# Patient Record
Sex: MALE | Race: WHITE | NOT HISPANIC OR LATINO | Employment: FULL TIME | URBAN - METROPOLITAN AREA
[De-identification: names, ages, dates, MRNs, and addresses within clinical notes are randomized per-mention and may not be internally consistent; named-entity substitution may affect disease eponyms.]

---

## 2023-02-23 PROBLEM — H61.23 BILATERAL IMPACTED CERUMEN: Status: ACTIVE | Noted: 2023-02-23

## 2023-02-23 PROBLEM — I44.4 LEFT ANTERIOR FASCICULAR BLOCK (LAFB): Status: ACTIVE | Noted: 2023-02-23

## 2023-02-23 PROBLEM — L50.0 ALLERGIC URTICARIA: Status: ACTIVE | Noted: 2023-02-23

## 2023-02-23 PROBLEM — G47.33 OBSTRUCTIVE SLEEP APNEA SYNDROME, SEVERE: Status: ACTIVE | Noted: 2023-02-23

## 2023-02-23 PROBLEM — R53.83 FATIGUE: Status: ACTIVE | Noted: 2023-02-23

## 2023-02-23 PROBLEM — I10 HYPERTENSION: Status: ACTIVE | Noted: 2023-02-23

## 2023-02-23 PROBLEM — R32 URINARY LEAKAGE: Status: ACTIVE | Noted: 2023-02-23

## 2023-02-23 PROBLEM — H61.22 IMPACTED CERUMEN OF LEFT EAR: Status: ACTIVE | Noted: 2023-02-23

## 2023-02-23 PROBLEM — M70.62 TROCHANTERIC BURSITIS OF LEFT HIP: Status: ACTIVE | Noted: 2023-02-23

## 2023-02-23 PROBLEM — E78.5 HYPERLIPIDEMIA: Status: ACTIVE | Noted: 2023-02-23

## 2023-02-23 PROBLEM — L30.9 DERMATITIS: Status: ACTIVE | Noted: 2023-02-23

## 2023-02-23 PROBLEM — S46.111A: Status: ACTIVE | Noted: 2023-02-23

## 2023-02-23 PROBLEM — E55.9 VITAMIN D DEFICIENCY: Status: ACTIVE | Noted: 2023-02-23

## 2023-02-23 PROBLEM — N53.19 EJACULATORY DISORDER: Status: ACTIVE | Noted: 2023-02-23

## 2023-02-23 PROBLEM — H90.3 SENSORINEURAL HEARING LOSS, BILATERAL: Status: ACTIVE | Noted: 2023-02-23

## 2023-02-23 PROBLEM — M25.552 LATERAL PAIN OF LEFT HIP: Status: ACTIVE | Noted: 2023-02-23

## 2023-02-23 PROBLEM — N52.9 ERECTILE DYSFUNCTION: Status: ACTIVE | Noted: 2023-02-23

## 2023-02-23 PROBLEM — G47.25 SLEEP DISORDER, CIRCADIAN, JET LAG TYPE: Status: ACTIVE | Noted: 2023-02-23

## 2023-02-23 PROBLEM — J30.9 ALLERGIC RHINITIS: Status: ACTIVE | Noted: 2023-02-23

## 2023-02-23 PROBLEM — R35.0 URINARY FREQUENCY: Status: ACTIVE | Noted: 2023-02-23

## 2023-02-23 PROBLEM — N62 GYNECOMASTIA: Status: ACTIVE | Noted: 2023-02-23

## 2023-02-23 PROBLEM — E11.9 DIABETES MELLITUS (MULTI): Status: ACTIVE | Noted: 2023-02-23

## 2023-02-23 PROBLEM — F41.8 DEPRESSION WITH ANXIETY: Status: ACTIVE | Noted: 2023-02-23

## 2023-02-23 PROBLEM — M25.559 HIP DISCOMFORT: Status: ACTIVE | Noted: 2023-02-23

## 2023-02-23 PROBLEM — I77.810 DILATED AORTIC ROOT (CMS-HCC): Status: ACTIVE | Noted: 2023-02-23

## 2023-02-23 PROBLEM — E29.1 HYPOGONADISM MALE: Status: ACTIVE | Noted: 2023-02-23

## 2023-02-23 PROBLEM — M54.50 LOW BACK PAIN: Status: ACTIVE | Noted: 2023-02-23

## 2023-02-23 PROBLEM — N53.9 MALE SEXUAL DYSFUNCTION: Status: ACTIVE | Noted: 2023-02-23

## 2023-02-23 PROBLEM — G47.00 INSOMNIA: Status: ACTIVE | Noted: 2023-02-23

## 2023-02-23 PROBLEM — N40.0 BENIGN PROSTATE HYPERPLASIA: Status: ACTIVE | Noted: 2023-02-23

## 2023-02-23 RX ORDER — OXICONAZOLE NITRATE 10 MG/ML
1 LOTION TOPICAL 2 TIMES DAILY
COMMUNITY
Start: 2021-04-13 | End: 2024-03-21 | Stop reason: ALTCHOICE

## 2023-02-23 RX ORDER — ARMODAFINIL 50 MG/1
50 TABLET ORAL 2 TIMES DAILY
COMMUNITY
Start: 2012-12-10 | End: 2024-01-02 | Stop reason: SDUPTHER

## 2023-02-23 RX ORDER — VENLAFAXINE HYDROCHLORIDE 150 MG/1
150 TABLET, EXTENDED RELEASE ORAL DAILY
COMMUNITY
End: 2023-12-15 | Stop reason: SDUPTHER

## 2023-02-23 RX ORDER — DOCUSATE SODIUM 100 MG/1
100 CAPSULE, LIQUID FILLED ORAL 2 TIMES DAILY
COMMUNITY

## 2023-02-23 RX ORDER — METFORMIN HYDROCHLORIDE 1000 MG/1
1000 TABLET ORAL 2 TIMES DAILY
COMMUNITY
Start: 2012-12-10

## 2023-02-23 RX ORDER — SODIUM FLUORIDE 5 MG/G
1.1 GEL, DENTIFRICE DENTAL DAILY
COMMUNITY
Start: 2021-06-25 | End: 2023-11-28 | Stop reason: ALTCHOICE

## 2023-02-23 RX ORDER — OLANZAPINE 2.5 MG/1
2.5 TABLET ORAL DAILY
COMMUNITY
Start: 2012-12-11 | End: 2024-01-31 | Stop reason: SDUPTHER

## 2023-02-23 RX ORDER — QUINAPRIL 5 1/1
5 TABLET ORAL DAILY
COMMUNITY
Start: 2012-08-02 | End: 2023-04-03 | Stop reason: SDUPTHER

## 2023-02-23 RX ORDER — TESTOSTERONE 50 MG/5G
50 GEL TRANSDERMAL 2 TIMES DAILY
COMMUNITY
Start: 2019-05-28 | End: 2023-11-28 | Stop reason: ALTCHOICE

## 2023-02-23 RX ORDER — TRAZODONE HYDROCHLORIDE 50 MG/1
50 TABLET ORAL NIGHTLY
COMMUNITY
Start: 2012-08-06 | End: 2024-01-31 | Stop reason: SDUPTHER

## 2023-02-23 RX ORDER — TAMSULOSIN HYDROCHLORIDE 0.4 MG/1
0.4 CAPSULE ORAL 2 TIMES DAILY
COMMUNITY
Start: 2014-11-04 | End: 2023-10-19 | Stop reason: SDUPTHER

## 2023-02-23 RX ORDER — FLUTICASONE PROPIONATE 50 MCG
2 SPRAY, SUSPENSION (ML) NASAL DAILY
COMMUNITY
Start: 2019-05-17 | End: 2023-04-03 | Stop reason: SDUPTHER

## 2023-02-23 RX ORDER — SILDENAFIL 100 MG/1
100 TABLET, FILM COATED ORAL
COMMUNITY
Start: 2019-03-26 | End: 2023-04-03 | Stop reason: SDUPTHER

## 2023-02-23 RX ORDER — GLIMEPIRIDE 4 MG/1
4 TABLET ORAL DAILY
COMMUNITY
Start: 2012-08-01 | End: 2023-10-19 | Stop reason: SDUPTHER

## 2023-02-23 RX ORDER — TRIAMCINOLONE ACETONIDE 1 MG/G
CREAM TOPICAL
COMMUNITY
Start: 2022-09-12

## 2023-02-23 RX ORDER — ATORVASTATIN CALCIUM 10 MG/1
10 TABLET, FILM COATED ORAL DAILY
COMMUNITY
Start: 2012-12-10 | End: 2023-06-19 | Stop reason: SDUPTHER

## 2023-03-31 PROBLEM — U07.1 COVID-19: Status: ACTIVE | Noted: 2023-03-31

## 2023-03-31 RX ORDER — TAMSULOSIN HYDROCHLORIDE 0.4 MG/1
2 CAPSULE ORAL DAILY
COMMUNITY
Start: 2014-11-04 | End: 2023-12-11 | Stop reason: SDUPTHER

## 2023-04-03 ENCOUNTER — LAB (OUTPATIENT)
Dept: LAB | Facility: LAB | Age: 78
End: 2023-04-03
Payer: COMMERCIAL

## 2023-04-03 ENCOUNTER — OFFICE VISIT (OUTPATIENT)
Dept: PRIMARY CARE | Facility: CLINIC | Age: 78
End: 2023-04-03
Payer: COMMERCIAL

## 2023-04-03 VITALS
SYSTOLIC BLOOD PRESSURE: 121 MMHG | TEMPERATURE: 97.8 F | DIASTOLIC BLOOD PRESSURE: 76 MMHG | HEART RATE: 85 BPM | WEIGHT: 180 LBS | BODY MASS INDEX: 30.9 KG/M2 | OXYGEN SATURATION: 95 %

## 2023-04-03 DIAGNOSIS — N53.9 MALE SEXUAL DYSFUNCTION: ICD-10-CM

## 2023-04-03 DIAGNOSIS — Z00.00 HEALTHCARE MAINTENANCE: ICD-10-CM

## 2023-04-03 DIAGNOSIS — E11.42 TYPE 2 DIABETES MELLITUS WITH DIABETIC POLYNEUROPATHY, WITHOUT LONG-TERM CURRENT USE OF INSULIN (MULTI): ICD-10-CM

## 2023-04-03 DIAGNOSIS — N52.9 ERECTILE DYSFUNCTION, UNSPECIFIED ERECTILE DYSFUNCTION TYPE: ICD-10-CM

## 2023-04-03 DIAGNOSIS — I10 HYPERTENSION, UNSPECIFIED TYPE: ICD-10-CM

## 2023-04-03 DIAGNOSIS — Z00.00 MEDICARE ANNUAL WELLNESS VISIT, SUBSEQUENT: ICD-10-CM

## 2023-04-03 DIAGNOSIS — J30.2 SEASONAL ALLERGIC RHINITIS, UNSPECIFIED TRIGGER: Primary | ICD-10-CM

## 2023-04-03 DIAGNOSIS — F41.8 DEPRESSION WITH ANXIETY: ICD-10-CM

## 2023-04-03 PROBLEM — U07.1 COVID-19: Status: RESOLVED | Noted: 2023-03-31 | Resolved: 2023-04-03

## 2023-04-03 LAB
ALANINE AMINOTRANSFERASE (SGPT) (U/L) IN SER/PLAS: 20 U/L (ref 10–52)
ALBUMIN (G/DL) IN SER/PLAS: 4.4 G/DL (ref 3.4–5)
ALBUMIN (MG/L) IN URINE: 12.5 MG/L
ALBUMIN/CREATININE (UG/MG) IN URINE: 8 UG/MG CRT (ref 0–30)
ALKALINE PHOSPHATASE (U/L) IN SER/PLAS: 47 U/L (ref 33–136)
ANION GAP IN SER/PLAS: 16 MMOL/L (ref 10–20)
ASPARTATE AMINOTRANSFERASE (SGOT) (U/L) IN SER/PLAS: 20 U/L (ref 9–39)
BASOPHILS (10*3/UL) IN BLOOD BY AUTOMATED COUNT: 0.05 X10E9/L (ref 0–0.1)
BASOPHILS/100 LEUKOCYTES IN BLOOD BY AUTOMATED COUNT: 0.8 % (ref 0–2)
BILIRUBIN TOTAL (MG/DL) IN SER/PLAS: 0.5 MG/DL (ref 0–1.2)
CALCIUM (MG/DL) IN SER/PLAS: 9.7 MG/DL (ref 8.6–10.6)
CARBON DIOXIDE, TOTAL (MMOL/L) IN SER/PLAS: 29 MMOL/L (ref 21–32)
CHLORIDE (MMOL/L) IN SER/PLAS: 101 MMOL/L (ref 98–107)
CHOLESTEROL (MG/DL) IN SER/PLAS: 138 MG/DL (ref 0–199)
CHOLESTEROL IN HDL (MG/DL) IN SER/PLAS: 40 MG/DL
CHOLESTEROL/HDL RATIO: 3.5
CREATININE (MG/DL) IN SER/PLAS: 1.04 MG/DL (ref 0.5–1.3)
CREATININE (MG/DL) IN URINE: 157 MG/DL (ref 20–370)
EOSINOPHILS (10*3/UL) IN BLOOD BY AUTOMATED COUNT: 0.27 X10E9/L (ref 0–0.4)
EOSINOPHILS/100 LEUKOCYTES IN BLOOD BY AUTOMATED COUNT: 4.2 % (ref 0–6)
ERYTHROCYTE DISTRIBUTION WIDTH (RATIO) BY AUTOMATED COUNT: 13.1 % (ref 11.5–14.5)
ERYTHROCYTE MEAN CORPUSCULAR HEMOGLOBIN CONCENTRATION (G/DL) BY AUTOMATED: 33.4 G/DL (ref 32–36)
ERYTHROCYTE MEAN CORPUSCULAR VOLUME (FL) BY AUTOMATED COUNT: 94 FL (ref 80–100)
ERYTHROCYTES (10*6/UL) IN BLOOD BY AUTOMATED COUNT: 4.63 X10E12/L (ref 4.5–5.9)
ESTIMATED AVERAGE GLUCOSE FOR HBA1C: 157 MG/DL
GFR MALE: 73 ML/MIN/1.73M2
GLUCOSE (MG/DL) IN SER/PLAS: 115 MG/DL (ref 74–99)
HEMATOCRIT (%) IN BLOOD BY AUTOMATED COUNT: 43.7 % (ref 41–52)
HEMOGLOBIN (G/DL) IN BLOOD: 14.6 G/DL (ref 13.5–17.5)
HEMOGLOBIN A1C/HEMOGLOBIN TOTAL IN BLOOD: 7.1 %
IMMATURE GRANULOCYTES/100 LEUKOCYTES IN BLOOD BY AUTOMATED COUNT: 0.3 % (ref 0–0.9)
LDL: 73 MG/DL (ref 0–99)
LEUKOCYTES (10*3/UL) IN BLOOD BY AUTOMATED COUNT: 6.4 X10E9/L (ref 4.4–11.3)
LYMPHOCYTES (10*3/UL) IN BLOOD BY AUTOMATED COUNT: 2 X10E9/L (ref 0.8–3)
LYMPHOCYTES/100 LEUKOCYTES IN BLOOD BY AUTOMATED COUNT: 31.1 % (ref 13–44)
MONOCYTES (10*3/UL) IN BLOOD BY AUTOMATED COUNT: 0.63 X10E9/L (ref 0.05–0.8)
MONOCYTES/100 LEUKOCYTES IN BLOOD BY AUTOMATED COUNT: 9.8 % (ref 2–10)
MUCUS, URINE: NORMAL /LPF
NEUTROPHILS (10*3/UL) IN BLOOD BY AUTOMATED COUNT: 3.47 X10E9/L (ref 1.6–5.5)
NEUTROPHILS/100 LEUKOCYTES IN BLOOD BY AUTOMATED COUNT: 53.8 % (ref 40–80)
NRBC (PER 100 WBCS) BY AUTOMATED COUNT: 0 /100 WBC (ref 0–0)
PLATELETS (10*3/UL) IN BLOOD AUTOMATED COUNT: 214 X10E9/L (ref 150–450)
POTASSIUM (MMOL/L) IN SER/PLAS: 4.5 MMOL/L (ref 3.5–5.3)
PROTEIN TOTAL: 7.2 G/DL (ref 6.4–8.2)
RBC, URINE: 1 /HPF (ref 0–5)
SODIUM (MMOL/L) IN SER/PLAS: 141 MMOL/L (ref 136–145)
TRIGLYCERIDE (MG/DL) IN SER/PLAS: 124 MG/DL (ref 0–149)
UREA NITROGEN (MG/DL) IN SER/PLAS: 17 MG/DL (ref 6–23)
VLDL: 25 MG/DL (ref 0–40)
WBC, URINE: NORMAL /HPF (ref 0–5)

## 2023-04-03 PROCEDURE — 1160F RVW MEDS BY RX/DR IN RCRD: CPT | Performed by: STUDENT IN AN ORGANIZED HEALTH CARE EDUCATION/TRAINING PROGRAM

## 2023-04-03 PROCEDURE — 83036 HEMOGLOBIN GLYCOSYLATED A1C: CPT

## 2023-04-03 PROCEDURE — G0439 PPPS, SUBSEQ VISIT: HCPCS | Performed by: STUDENT IN AN ORGANIZED HEALTH CARE EDUCATION/TRAINING PROGRAM

## 2023-04-03 PROCEDURE — 1036F TOBACCO NON-USER: CPT | Performed by: STUDENT IN AN ORGANIZED HEALTH CARE EDUCATION/TRAINING PROGRAM

## 2023-04-03 PROCEDURE — 3074F SYST BP LT 130 MM HG: CPT | Performed by: STUDENT IN AN ORGANIZED HEALTH CARE EDUCATION/TRAINING PROGRAM

## 2023-04-03 PROCEDURE — 82043 UR ALBUMIN QUANTITATIVE: CPT

## 2023-04-03 PROCEDURE — 1159F MED LIST DOCD IN RCRD: CPT | Performed by: STUDENT IN AN ORGANIZED HEALTH CARE EDUCATION/TRAINING PROGRAM

## 2023-04-03 PROCEDURE — 82570 ASSAY OF URINE CREATININE: CPT

## 2023-04-03 PROCEDURE — 1170F FXNL STATUS ASSESSED: CPT | Performed by: STUDENT IN AN ORGANIZED HEALTH CARE EDUCATION/TRAINING PROGRAM

## 2023-04-03 PROCEDURE — 36415 COLL VENOUS BLD VENIPUNCTURE: CPT

## 2023-04-03 PROCEDURE — 80053 COMPREHEN METABOLIC PANEL: CPT

## 2023-04-03 PROCEDURE — 85025 COMPLETE CBC W/AUTO DIFF WBC: CPT

## 2023-04-03 PROCEDURE — 99397 PER PM REEVAL EST PAT 65+ YR: CPT | Performed by: STUDENT IN AN ORGANIZED HEALTH CARE EDUCATION/TRAINING PROGRAM

## 2023-04-03 PROCEDURE — 81001 URINALYSIS AUTO W/SCOPE: CPT

## 2023-04-03 PROCEDURE — 3078F DIAST BP <80 MM HG: CPT | Performed by: STUDENT IN AN ORGANIZED HEALTH CARE EDUCATION/TRAINING PROGRAM

## 2023-04-03 PROCEDURE — 80061 LIPID PANEL: CPT

## 2023-04-03 RX ORDER — SILDENAFIL 100 MG/1
100 TABLET, FILM COATED ORAL
Qty: 10 TABLET | Refills: 2 | Status: SHIPPED | OUTPATIENT
Start: 2023-04-03

## 2023-04-03 RX ORDER — FINASTERIDE 5 MG/1
5 TABLET, FILM COATED ORAL
COMMUNITY
Start: 2018-03-04 | End: 2023-11-28 | Stop reason: ALTCHOICE

## 2023-04-03 RX ORDER — FLUTICASONE PROPIONATE 50 MCG
2 SPRAY, SUSPENSION (ML) NASAL DAILY
Qty: 16 G | Refills: 2 | Status: SHIPPED | OUTPATIENT
Start: 2023-04-03 | End: 2023-07-03

## 2023-04-03 RX ORDER — QUINAPRIL 5 1/1
5 TABLET ORAL DAILY
Qty: 90 TABLET | Refills: 3 | Status: SHIPPED
Start: 2023-04-03 | End: 2023-06-29 | Stop reason: WASHOUT

## 2023-04-03 ASSESSMENT — PATIENT HEALTH QUESTIONNAIRE - PHQ9
SUM OF ALL RESPONSES TO PHQ9 QUESTIONS 1 AND 2: 1
10. IF YOU CHECKED OFF ANY PROBLEMS, HOW DIFFICULT HAVE THESE PROBLEMS MADE IT FOR YOU TO DO YOUR WORK, TAKE CARE OF THINGS AT HOME, OR GET ALONG WITH OTHER PEOPLE: SOMEWHAT DIFFICULT
2. FEELING DOWN, DEPRESSED OR HOPELESS: SEVERAL DAYS
1. LITTLE INTEREST OR PLEASURE IN DOING THINGS: NOT AT ALL

## 2023-04-03 ASSESSMENT — ACTIVITIES OF DAILY LIVING (ADL)
GROCERY_SHOPPING: INDEPENDENT
DOING_HOUSEWORK: INDEPENDENT
BATHING: INDEPENDENT
MANAGING_FINANCES: INDEPENDENT
DRESSING: INDEPENDENT
TAKING_MEDICATION: INDEPENDENT

## 2023-04-03 NOTE — PATIENT INSTRUCTIONS
Please stop at the lab ([address]) to complete your blood and/or urine work that I've ordered for you.    I will contact you with the results at my soonest convenience. I strongly urge you to use [employer] as this is the quickest and easiest way to access your results and recieve my correspondences.     I have renewed your medications today     I recommend that you lose weight via lifestyle modifications such as diet and exercise.

## 2023-04-03 NOTE — PROGRESS NOTES
Subjective   Patient ID: Farooq Hendrix is a 77 y.o. male who presents for Annual Exam and Medicare Annual Wellness Visit Subsequent.    76 CM with PMhx controlled DM2, controlled severe depression, ED presenting for CPE and MWV.     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 , 1 son ([place], ). Nonsmoker. Social EtOH. No illicits. Occasional exercise.     Re: DM2- see endocrine notes. On metformin and glimepiride. A1C at goal (7.0%). Patient denies symptomatic highs or lows with regards to their glucose. Reports no polyuria, polydipsia, fatigue, vision changes. Reports longstanding neuropathy in feet that is mild. Reports fair foot hygiene, denies knowledge of any foot ulcers or trauma.      Re: ED - stable. See urology notes from Dr. Severino. Erections are OK. Asking for refill on his viagra.      Re: psych - depression is severe, currently well controlled. On olanzapine, armodafinil, and venlafaxine. Meets with psych regularly (Dr. Foy).    Re: HTN - asking for refill of his ACEi. Reports no sx high or low from HTN; denies blurry vision, HA, dizziness LoC CP SoB Goldstein and leg swelling      Re: HM - CRS UTD, no longer required. PSA no longer required. Shots currently UTD.      PMHx, FHx, Social Hx, Surg Hx personally reviewed at this appointment. No pertinent findings and/or changes from prior (if applicable).     ROS: Denies wt gain/loss f/c HA LoC CP SOB NVDC. See HPI above, and scanned sheet (if applicable). All other systems are reviewed and are without complaint.            Review of Systems    Objective   /76   Pulse 85   Temp 36.6 °C (97.8 °F)   Wt 81.6 kg (180 lb)   SpO2 95%   BMI 30.90 kg/m²     Physical Exam  Gen:  NAD. AAO x3.  HEENT: NC/AT. Anicteric sclera, symmetric pupils. MMM no thrush.  Neck: Soft, supple. No LAD. No goiter.  CV: RRR nl s1s2 no m/r/g  Pulm: CTAB no w/r/r, good air exchange  GI: soft NTND BS+ no hsm  Ext: WWP no edema  Neuro: II-XII grossly intact,  nonfocal systemic findings  MSK: 5/5 strength b/l UE and LE  Gait: unremarkable   Feet: no ulcers, nl monofilament testing       Assessment/Plan   Problem List Items Addressed This Visit       Allergic rhinitis - Primary    Relevant Medications    fluticasone (Flonase) 50 mcg/actuation nasal spray    Depression with anxiety     Follow up psych  Continue with his current meds         Type 2 diabetes mellitus with diabetic polyneuropathy, without long-term current use of insulin (CMS/[facility])    Relevant Orders    CBC and Auto Differential    Comprehensive Metabolic Panel    Lipid Panel    Hemoglobin A1C    Albumin , Urine Random    Urinalysis Microscopic Only    Male sexual dysfunction    Relevant Medications    sildenafil (Viagra) 100 mg tablet    Erectile dysfunction    Hypertension     - continue current regimen  - routine lab work if not recent  - continue lifestyle modifications          Relevant Medications    quinapril (Accupril) 5 mg tablet    Other Relevant Orders    CBC and Auto Differential    Comprehensive Metabolic Panel    Lipid Panel     Other Visit Diagnoses       Healthcare maintenance        Relevant Orders    CBC and Auto Differential    Comprehensive Metabolic Panel    Lipid Panel

## 2023-04-17 LAB
AMPHETAMINES,URINE: <50 NG/ML
MDA,URINE: <200 NG/ML
MDEA,URINE: <200 NG/ML
MDMA,UR: <200 NG/ML
METHAMPHETAMINE QUANTITATIVE URINE: <200 NG/ML
PHENTERMINE,UR: <200 NG/ML

## 2023-06-19 DIAGNOSIS — E78.5 HYPERLIPIDEMIA, UNSPECIFIED HYPERLIPIDEMIA TYPE: ICD-10-CM

## 2023-06-20 RX ORDER — ATORVASTATIN CALCIUM 10 MG/1
10 TABLET, FILM COATED ORAL DAILY
Qty: 90 TABLET | Refills: 3 | Status: SHIPPED | OUTPATIENT
Start: 2023-06-20 | End: 2024-01-02 | Stop reason: SDUPTHER

## 2023-06-29 DIAGNOSIS — E11.42 TYPE 2 DIABETES MELLITUS WITH DIABETIC POLYNEUROPATHY, WITHOUT LONG-TERM CURRENT USE OF INSULIN (MULTI): Primary | ICD-10-CM

## 2023-06-29 DIAGNOSIS — I10 HYPERTENSION, UNSPECIFIED TYPE: ICD-10-CM

## 2023-06-29 RX ORDER — LISINOPRIL 10 MG/1
10 TABLET ORAL DAILY
Qty: 90 TABLET | Refills: 3 | Status: SHIPPED | OUTPATIENT
Start: 2023-06-29 | End: 2024-06-28

## 2023-07-01 DIAGNOSIS — J30.2 SEASONAL ALLERGIC RHINITIS, UNSPECIFIED TRIGGER: ICD-10-CM

## 2023-07-03 RX ORDER — FLUTICASONE PROPIONATE 50 MCG
2 SPRAY, SUSPENSION (ML) NASAL DAILY
Qty: 48 ML | Refills: 2 | Status: SHIPPED | OUTPATIENT
Start: 2023-07-03

## 2023-08-02 ENCOUNTER — APPOINTMENT (OUTPATIENT)
Dept: LAB | Facility: LAB | Age: 78
End: 2023-08-02
Payer: COMMERCIAL

## 2023-08-02 LAB
CALCIDIOL (25 OH VITAMIN D3) (NG/ML) IN SER/PLAS: 18 NG/ML
COBALAMIN (VITAMIN B12) (PG/ML) IN SER/PLAS: 280 PG/ML (ref 211–911)
FOLATE (NG/ML) IN SER/PLAS: 15.7 NG/ML
THYROTROPIN (MIU/L) IN SER/PLAS BY DETECTION LIMIT <= 0.05 MIU/L: 1.09 MIU/L (ref 0.44–3.98)

## 2023-09-19 PROBLEM — R52 PAIN: Status: ACTIVE | Noted: 2019-03-19

## 2023-09-19 PROBLEM — L23.9 ALLERGIC CONTACT DERMATITIS: Status: ACTIVE | Noted: 2019-09-25

## 2023-09-19 PROBLEM — F39 MOOD DISORDER (CMS-HCC): Status: ACTIVE | Noted: 2023-09-19

## 2023-09-19 PROBLEM — B07.8 OTHER VIRAL WARTS: Status: ACTIVE | Noted: 2018-10-09

## 2023-09-19 PROBLEM — E11.9 CONTROLLED TYPE 2 DIABETES MELLITUS WITHOUT COMPLICATION, WITHOUT LONG-TERM CURRENT USE OF INSULIN (MULTI): Status: ACTIVE | Noted: 2017-02-15

## 2023-09-19 PROBLEM — Z85.828 PERSONAL HISTORY OF OTHER MALIGNANT NEOPLASM OF SKIN: Status: ACTIVE | Noted: 2018-06-13

## 2023-09-19 PROBLEM — L30.4 INTERTRIGO: Status: ACTIVE | Noted: 2019-02-19

## 2023-09-19 PROBLEM — L57.0 KERATOSIS: Status: ACTIVE | Noted: 2017-02-23

## 2023-09-19 PROBLEM — D48.5 NEOPLASM OF UNCERTAIN BEHAVIOR OF SKIN: Status: ACTIVE | Noted: 2018-03-01

## 2023-09-19 PROBLEM — C80.1 MALIGNANT NEOPLASM (MULTI): Status: ACTIVE | Noted: 2021-08-09

## 2023-09-19 PROBLEM — L73.9 FOLLICULAR DISORDER: Status: ACTIVE | Noted: 2019-06-11

## 2023-09-19 PROBLEM — B35.3 TINEA PEDIS: Status: ACTIVE | Noted: 2019-02-19

## 2023-09-19 PROBLEM — L57.0 ACTINIC KERATOSIS: Status: ACTIVE | Noted: 2017-02-23

## 2023-09-19 PROBLEM — C44.91 BASAL CELL CARCINOMA: Status: ACTIVE | Noted: 2018-03-14

## 2023-09-19 PROBLEM — L29.9 PRURITUS: Status: ACTIVE | Noted: 2017-02-14

## 2023-09-19 PROBLEM — R21 RASH AND NONSPECIFIC SKIN ERUPTION: Status: ACTIVE | Noted: 2017-02-14

## 2023-09-19 RX ORDER — DULOXETIN HYDROCHLORIDE 20 MG/1
CAPSULE, DELAYED RELEASE ORAL
COMMUNITY
End: 2023-11-28 | Stop reason: ALTCHOICE

## 2023-09-19 RX ORDER — CICLOPIROX OLAMINE 7.7 MG/G
CREAM TOPICAL
COMMUNITY
Start: 2018-02-26

## 2023-09-19 RX ORDER — TRAZODONE HYDROCHLORIDE 50 MG/1
2 TABLET ORAL NIGHTLY
COMMUNITY
Start: 2012-05-21 | End: 2023-10-19 | Stop reason: SDUPTHER

## 2023-09-19 RX ORDER — METFORMIN HYDROCHLORIDE 1000 MG/1
1 TABLET ORAL 2 TIMES DAILY
COMMUNITY
Start: 2012-12-10 | End: 2023-10-19 | Stop reason: SDUPTHER

## 2023-09-19 RX ORDER — VENLAFAXINE 37.5 MG/1
112.5 TABLET ORAL
COMMUNITY
End: 2023-10-19 | Stop reason: ALTCHOICE

## 2023-09-19 RX ORDER — ATORVASTATIN CALCIUM 10 MG/1
TABLET, FILM COATED ORAL
COMMUNITY
End: 2023-10-19 | Stop reason: SDUPTHER

## 2023-09-19 RX ORDER — ARMODAFINIL 50 MG/1
100 TABLET ORAL
COMMUNITY
Start: 2012-08-23 | End: 2023-10-19 | Stop reason: SDUPTHER

## 2023-09-19 RX ORDER — ASPIRIN 81 MG/1
81 TABLET ORAL
COMMUNITY
End: 2023-11-28 | Stop reason: ALTCHOICE

## 2023-09-19 RX ORDER — QUINAPRIL 5 1/1
5 TABLET ORAL
COMMUNITY
End: 2023-11-28 | Stop reason: ALTCHOICE

## 2023-09-19 RX ORDER — SEMAGLUTIDE 0.68 MG/ML
INJECTION, SOLUTION SUBCUTANEOUS
COMMUNITY
Start: 2023-06-22 | End: 2023-12-11 | Stop reason: SDUPTHER

## 2023-10-04 ENCOUNTER — CLINICAL SUPPORT (OUTPATIENT)
Dept: PRIMARY CARE | Facility: CLINIC | Age: 78
End: 2023-10-04
Payer: COMMERCIAL

## 2023-10-04 PROCEDURE — 90471 IMMUNIZATION ADMIN: CPT | Performed by: STUDENT IN AN ORGANIZED HEALTH CARE EDUCATION/TRAINING PROGRAM

## 2023-10-04 PROCEDURE — 90662 IIV NO PRSV INCREASED AG IM: CPT | Performed by: STUDENT IN AN ORGANIZED HEALTH CARE EDUCATION/TRAINING PROGRAM

## 2023-10-19 ENCOUNTER — OFFICE VISIT (OUTPATIENT)
Dept: OTOLARYNGOLOGY | Facility: CLINIC | Age: 78
End: 2023-10-19
Payer: COMMERCIAL

## 2023-10-19 VITALS — WEIGHT: 170 LBS | TEMPERATURE: 96.9 F | BODY MASS INDEX: 29.18 KG/M2

## 2023-10-19 DIAGNOSIS — H90.3 SENSORINEURAL HEARING LOSS (SNHL) OF BOTH EARS: Primary | ICD-10-CM

## 2023-10-19 DIAGNOSIS — H61.23 BILATERAL IMPACTED CERUMEN: ICD-10-CM

## 2023-10-19 PROCEDURE — 1036F TOBACCO NON-USER: CPT | Performed by: NURSE PRACTITIONER

## 2023-10-19 PROCEDURE — 69210 REMOVE IMPACTED EAR WAX UNI: CPT | Performed by: NURSE PRACTITIONER

## 2023-10-19 PROCEDURE — 1160F RVW MEDS BY RX/DR IN RCRD: CPT | Performed by: NURSE PRACTITIONER

## 2023-10-19 PROCEDURE — 99212 OFFICE O/P EST SF 10 MIN: CPT | Performed by: NURSE PRACTITIONER

## 2023-10-19 PROCEDURE — 1159F MED LIST DOCD IN RCRD: CPT | Performed by: NURSE PRACTITIONER

## 2023-10-19 PROCEDURE — 1126F AMNT PAIN NOTED NONE PRSNT: CPT | Performed by: NURSE PRACTITIONER

## 2023-10-19 ASSESSMENT — PATIENT HEALTH QUESTIONNAIRE - PHQ9
1. LITTLE INTEREST OR PLEASURE IN DOING THINGS: NOT AT ALL
SUM OF ALL RESPONSES TO PHQ9 QUESTIONS 1 AND 2: 0
2. FEELING DOWN, DEPRESSED OR HOPELESS: NOT AT ALL

## 2023-10-19 NOTE — PROGRESS NOTES
Subjective   Patient ID: Farooq Hendrix is a 78 y.o. male who presents for Follow-up.  HPI patient is here for routine ear cleaning follow-up.  He was last seen by Sully Apodaca CNP on March 2023.  Patient has a known bilateral sensorineural hearing loss and wears bilateral hearing aids.  He reports that he gains benefits with the hearing aids.  In the interim there is no ear infection.  He denies having any drainage or pain today.    Review of Systems      All other systems have been reviewed and are negative for complaints except for those mentioned in history of present illness, past medical history and problem list       Objective   Physical Exam  CONSTITUTIONAL: No acute distress, normal facial features; No fever; no chills  VOICE: No hoarseness or other audible abnormality  RESPIRATION: Breathing comfortably, no stridor; normal breathing effort  CV: No cyanosis visible on the face and neck area  EYES:Pupils equal and round ; no erythema; conjunctiva clear; sclera white  NEURO: Alert and oriented, able to raise eyebrows symmetrical bilateral, smile with no facial droop, able to swallow  HEAD AND FACE: Symmetric facial features, no masses or lesions    Right ear examination: External ear normal.  EAC with impacted cerumen.  TM not visualized.  Left ear examination: External ear normal.  EAC with impacted cerumen.  TM not visualized.    NOSE: External nose midline  ORAL CAVITY: No lesions of external lips  NECK/LYMPH: No obvious deformity or lesions; trachea is midline  PSYCH: Alert and oriented with appropriate mood and affect       Patient ID: Farooq Hendrix is a 78 y.o. male.    Procedures  Cerumen removal    Consent:  The planned procedure is discussed including possible risk, benefits and alternative treatments reviewed.  Verbal consent is obtained.    Indications:Obstructed cerumen is noted affecting hearing and causing discomfort.    Procedure: The ears are examined microscopically.  Using speculum,  rivera, #7, #5 suction the obstructive cerumen in both the ears were removed.    Findings: Cerumen and epithelial debris obstruction in both external auditory canals.  Inspection of tympanic membrane after cleaning showed intact with no effusion, retraction or perforation.    Post procedure: The patient tolerated the procedure well without complications     Assessment/Plan       Problem List Items Addressed This Visit       Bilateral impacted cerumen     Other Visit Diagnoses       Sensorineural hearing loss (SNHL) of both ears    -  Primary         This patient presents for evaluation of cerumen impaction. The ear/s cleaned using microscope and instruments.  Patient tolerated the procedure well. Inspection of TM after cleaning showed intact and WNL.  Patient was provided instructions on ear care for cerumen in the discussion summary. Patient may follow up as needed for repeat cleaning or for all other ENT concerns.  All questions were answered to patient's satisfaction.

## 2023-10-27 ENCOUNTER — CLINICAL SUPPORT (OUTPATIENT)
Dept: AUDIOLOGY | Facility: HOSPITAL | Age: 78
End: 2023-10-27

## 2023-10-27 DIAGNOSIS — H90.3 SENSORINEURAL HEARING LOSS (SNHL) OF BOTH EARS: Primary | ICD-10-CM

## 2023-10-27 PROCEDURE — V5261 HEARING AID, DIGIT, BIN, BTE: HCPCS | Mod: AUDSP | Performed by: AUDIOLOGIST

## 2023-10-27 PROCEDURE — V5110 HEARING AID DISPENSING FEE: HCPCS | Mod: AUDSP | Performed by: AUDIOLOGIST

## 2023-10-27 NOTE — PROGRESS NOTES
Farooq Hendrix, age 78, was seen for a hearing aid check today. He reported that the dome had come off of his hearing aid and he was unsure whether or not it was in his ear. He reported that he has been thinking about getting new devices and that he would like to discuss his options today as well.     Procedure:   - Cleaned hearing aids.   - Replaced domes.   - Removed dome from right ear without incident.     After discussing Farooq's lifestyle and his prioritized listening environments, he deciding to move forward with purchasing [employer] L90 RT devices. He selected the color P5 with length 2 receivers. He opted to pay in full today to access the prompt pay discount. He paid $6,480 at the . He was counseled that he will be contacted by our scheduling department to schedule a hearing aid fitting once his devices arrive. Mr. Hendrix should also be scheduled for an updated hearing test as well, to ensure best outcomes with his new devices. Perhaps these could be scheduled together.     Treatment Plan:   - Schedule hearing aid fitting once devices have arrived.   - Schedule updated hearing evaluation.   - Follow up with medical providers as indicated.   - Continue use of binaural hearing aids.     Time: 1415- 1445    MATT Garcia under the supervision of Alex Knight CCC-A

## 2023-11-14 ENCOUNTER — APPOINTMENT (OUTPATIENT)
Dept: BEHAVIORAL HEALTH | Facility: CLINIC | Age: 78
End: 2023-11-14
Payer: COMMERCIAL

## 2023-11-24 ENCOUNTER — CLINICAL SUPPORT (OUTPATIENT)
Dept: AUDIOLOGY | Facility: HOSPITAL | Age: 78
End: 2023-11-24
Payer: COMMERCIAL

## 2023-11-24 DIAGNOSIS — H90.3 SENSORINEURAL HEARING LOSS (SNHL) OF BOTH EARS: Primary | ICD-10-CM

## 2023-11-24 NOTE — PROGRESS NOTES
Hearing Aid Fitting  Farooq Hendrix was seen today for a hearing aid fitting  Right model Audeo L90RT, Right serial # [id number], Left model Audeo L90RT, Left serial # [id number], Service warranty expires on: 1/27/2027, and Loss or damage warranty expires on: 1/27/2027  Otoscopic inspection showed: Not indicated  Aids coupled via: domes receivers size #2 dome size vented medium  Aids were set to a target gain of 100 %  Fitting formula: NAL NL2  Feedback manager was run today.   Battery Size:  rechargeable    Summary of Patient Education: The hearing aids are a good physical fit  Instructed on care and use of the rechargeable battery system   Instructed on care and use of the hearing aids   Written manual and user guide provided  Practiced insertion and removal of hearing aids  Warranty information, loss and damage protocol, and the trial period were explained  Out-of-pocket costs for ongoing maintenance and programming of the aids after the initial 90 day period was reviewed  Purchase agreement was signed and dated by: patient  Paired hearing aids to phone   Objective hearing aid verification performed   Aided sound field testing performed  Follow up appointment scheduled on: two weeks

## 2023-11-28 ENCOUNTER — OFFICE VISIT (OUTPATIENT)
Dept: BEHAVIORAL HEALTH | Facility: CLINIC | Age: 78
End: 2023-11-28
Payer: COMMERCIAL

## 2023-11-28 DIAGNOSIS — F41.8 DEPRESSION WITH ANXIETY: ICD-10-CM

## 2023-11-28 PROCEDURE — 1160F RVW MEDS BY RX/DR IN RCRD: CPT | Performed by: PSYCHIATRY & NEUROLOGY

## 2023-11-28 PROCEDURE — 1126F AMNT PAIN NOTED NONE PRSNT: CPT | Performed by: PSYCHIATRY & NEUROLOGY

## 2023-11-28 PROCEDURE — 1159F MED LIST DOCD IN RCRD: CPT | Performed by: PSYCHIATRY & NEUROLOGY

## 2023-11-28 PROCEDURE — 99213 OFFICE O/P EST LOW 20 MIN: CPT | Performed by: PSYCHIATRY & NEUROLOGY

## 2023-11-28 PROCEDURE — 1036F TOBACCO NON-USER: CPT | Performed by: PSYCHIATRY & NEUROLOGY

## 2023-11-28 NOTE — PROGRESS NOTES
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 Seen in office for follow up visit. He is seeking transfer of care; he was previously seeing Dr. Foy.     Subjective:       He says his brother  of ALS in end of August. A friend also  shortly afterwards. The events in the [place] also have been affecting him. He says he spends a lot of time looking at the news.     He says he got depressed a couple of days due to situations but notes that overall, he is coping fairly well.   Denies anhedonia.   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 This is discouraging. He says he not very motivated but plans to get it done.   Appetite - suppressed on Ozempic; has lost some weight.   He says he feels tired a lot.   He says his sleep is interrupted. He does not feel rested. He was diagnosed a long time ago with sleep apnea - he was using a CPAP but there was a recall; he could not get a new machine sent for months, and he has not been able to put it together.   Denies any death wishes or suicidal thoughts, intent or plans.     Reports longstanding anxiety and worries but denies excessive worries.   No panic attacks.     OCD - Used to have OCD in the past. No significant compulsions and obsessions recently.     Denies any manic or hypomanic episodes.      Denies AVH, paranoia or delusions.       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     Current medications:   Venlafaxine ER 150mg once daily  Olanzapine 2.5mg at bedtime. He says he stopped olanzapine once after a psychologist advised to do so and his mood became really bad.   Trazodone 50mg two tablets at bedtime  Armodafinil 50mg two tablets at every morning - has helped with motivation.      Past medications:   Fluoxetine - on it for a while.   Amitriptyline - had visual side effects.  "        Psychiatric Review Of Systems:   As above.       Medical Review Of Systems:    Pertinent items are noted in HPI.        Record Review: brief       Mental Status Evaluation:  Appearance: Fair grooming.   Behavior/Attitude: Cooperative. Pleasant.   Motor: Psychomotor activity in average range. No abnormal involuntary movements.   Gait: Normal.  Speech: Regular in rate, tone and volume. No pressure.  Mood: \"okay\"  Affect: Congruent to stated mood. Mobilized appropriately. Normal range.   Thought process: Goal-directed. Linear. Organized.  Thought content: No paranoia, delusion or ideas of reference elicited. No hallucinations in auditory, visual or other sensory modalities.   Suicidal ideation: denied.  Homicidal ideation: denied.   Insight: Fair.  Judgment: Fair.  Orientation: oriented correctly to time, place and person.  Recent and remote memory: intact based on recall of recent and remote autobiographical memories.  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 Seen in office for initial psychiatric evaluation.     Initial impression:   8/1/2023 - He has a long history of depression and anxiety which seem fairly well-controlled on current medication regimen. He also had OCD symptoms in the past but has not had significant symptoms recently.     Labs:   8/2/23 - Low Vit D; low normal Vit B12; normal folate, TSH. Has been taking Vit D and Vit B12 supplements.     11/28/23 - Stable mood and anxiety although he has had to deal with losses and stressors.      Diagnosis:   Major depressive disorder, recurrent, in partial remission  Other specified anxiety disorder  H/O OCD.       Treatment Plan/Recommendations:   - Continue current  medication regimen.   - Continue Vit D " and Vit B12 supplementation.   - Continue psychotherapy.   - Provided supportive therapy.   - Encouraged to follow up with using CPAP for sleep apnea as it may be causing daytime sleepiness and fatigue. .   - Follow up with me in 3 months.   - Call sooner if needed.     Review with patient: Treatment plan reviewed with the patient.    Mohini Tello MD

## 2023-11-28 NOTE — PATIENT INSTRUCTIONS
Plan:   - Continue your current medications:    Venlafaxine ER 150mg once daily   Olanzapine 2.5mg at bedtime   Armodafinil 50mg two tablets in the morning   Trazodone 50mg two tablets at bedtime.   - Continue vitamin D 2000 international units  and Vitamin B12 1000 mcg supplementation.   - Please follow up with CPAP device  and start using it regularly.   - Continue psychotherapy.   - Follow up with me in about 3 months.   - Call sooner ([phone]) in case of any questions or concerns.  - Call [phone] for mental health crisis or suicidal thoughts, or call 911 or go to the nearest emergency room for emergencies.  
stretcher

## 2023-12-08 ENCOUNTER — CLINICAL SUPPORT (OUTPATIENT)
Dept: AUDIOLOGY | Facility: HOSPITAL | Age: 78
End: 2023-12-08
Payer: COMMERCIAL

## 2023-12-08 DIAGNOSIS — H90.3 SENSORINEURAL HEARING LOSS, BILATERAL: Primary | ICD-10-CM

## 2023-12-08 NOTE — PROGRESS NOTES
Chief Complaint   Patient presents with    Hearing Aid Check     Name: Farooq Hendrix  YOB: 1945  Age: 78 y.o.    Farooq Hendrix was seen for hearing aid dispensing follow up on 12/8/2023.  Hearing aids were dispensed 11/24/2023.  Mr. Hendrix is an experienced hearing aid user.    No issues with the hearing aids were noted.  Mr. Hendrix reports improved speech understanding benefit with the new hearing aids.   Hearing aids are paired to Mr. Hendrix's iphone.  Reviewed phone call use through bluetooth hearing aid component.  He reports the phone will ring through the hearing aids however for some calls the voice component is then transferred back to the phone.  Demonstrated pressing the 'audio' tab during a phone call to re-select 'phonak hearing aid' as audio source for the call.    Hearing aid software update was completed.  Cleaning brush was provided    Replacement  case will be ordered under warranty.  Will contact Mr. Hendrix at [phone] when the case arrives.    Time: 1500 - 7844

## 2023-12-11 DIAGNOSIS — Z00.00 HEALTHCARE MAINTENANCE: ICD-10-CM

## 2023-12-11 DIAGNOSIS — E11.9 TYPE 2 DIABETES MELLITUS WITHOUT COMPLICATION, WITHOUT LONG-TERM CURRENT USE OF INSULIN (MULTI): Primary | ICD-10-CM

## 2023-12-11 RX ORDER — TAMSULOSIN HYDROCHLORIDE 0.4 MG/1
0.8 CAPSULE ORAL DAILY
Qty: 90 CAPSULE | Refills: 3 | Status: SHIPPED | OUTPATIENT
Start: 2023-12-11 | End: 2024-03-21 | Stop reason: SDUPTHER

## 2023-12-11 RX ORDER — SEMAGLUTIDE 0.68 MG/ML
0.5 INJECTION, SOLUTION SUBCUTANEOUS
Qty: 9 ML | Refills: 3 | Status: SHIPPED | OUTPATIENT
Start: 2023-12-11 | End: 2024-03-14 | Stop reason: SDUPTHER

## 2023-12-15 ENCOUNTER — TELEPHONE (OUTPATIENT)
Dept: BEHAVIORAL HEALTH | Facility: CLINIC | Age: 78
End: 2023-12-15
Payer: COMMERCIAL

## 2023-12-15 DIAGNOSIS — F41.8 DEPRESSION WITH ANXIETY: ICD-10-CM

## 2023-12-15 RX ORDER — VENLAFAXINE HYDROCHLORIDE 150 MG/1
150 TABLET, EXTENDED RELEASE ORAL DAILY
Qty: 90 TABLET | Refills: 1 | Status: SHIPPED | OUTPATIENT
Start: 2023-12-15 | End: 2024-05-20

## 2024-01-02 ENCOUNTER — TELEPHONE (OUTPATIENT)
Dept: BEHAVIORAL HEALTH | Facility: CLINIC | Age: 79
End: 2024-01-02
Payer: COMMERCIAL

## 2024-01-02 DIAGNOSIS — E78.5 HYPERLIPIDEMIA, UNSPECIFIED HYPERLIPIDEMIA TYPE: ICD-10-CM

## 2024-01-02 DIAGNOSIS — F41.8 DEPRESSION WITH ANXIETY: ICD-10-CM

## 2024-01-02 RX ORDER — ATORVASTATIN CALCIUM 10 MG/1
10 TABLET, FILM COATED ORAL DAILY
Qty: 90 TABLET | Refills: 3 | Status: SHIPPED | OUTPATIENT
Start: 2024-01-02

## 2024-01-02 RX ORDER — ARMODAFINIL 50 MG/1
50 TABLET ORAL 2 TIMES DAILY
Qty: 180 TABLET | Refills: 0 | Status: SHIPPED
Start: 2024-01-02 | End: 2024-01-16 | Stop reason: SDUPTHER

## 2024-01-02 NOTE — PROGRESS NOTES
[facility]) [place], [place] - [address] [phone]  Jarek as Reviewed

## 2024-01-16 ENCOUNTER — TELEPHONE (OUTPATIENT)
Dept: BEHAVIORAL HEALTH | Facility: CLINIC | Age: 79
End: 2024-01-16
Payer: COMMERCIAL

## 2024-01-16 DIAGNOSIS — F41.8 DEPRESSION WITH ANXIETY: ICD-10-CM

## 2024-01-16 RX ORDER — ARMODAFINIL 50 MG/1
50 TABLET ORAL 2 TIMES DAILY
Qty: 28 TABLET | Refills: 0 | Status: SHIPPED | OUTPATIENT
Start: 2024-01-16 | End: 2024-01-19 | Stop reason: SDUPTHER

## 2024-01-16 NOTE — PROGRESS NOTES
[id number] - [place], [place] - [address] [phone]     Patient states prior pharmacy is trying to get rx in stock. He is asking if you would please send in a 14 day supply to above pharmacy. Thank you.

## 2024-01-19 ENCOUNTER — TELEPHONE (OUTPATIENT)
Dept: OTHER | Age: 79
End: 2024-01-19
Payer: COMMERCIAL

## 2024-01-19 DIAGNOSIS — F41.8 DEPRESSION WITH ANXIETY: ICD-10-CM

## 2024-01-19 RX ORDER — ARMODAFINIL 50 MG/1
50 TABLET ORAL 2 TIMES DAILY
Qty: 180 TABLET | Refills: 0 | Status: SHIPPED | OUTPATIENT
Start: 2024-01-19 | End: 2024-04-18

## 2024-01-19 NOTE — TELEPHONE ENCOUNTER
2nd try - 1st try didn't go thru    Pt needs armodafinil 50mgs renewed to [facility] pharmacy,     [facility] doesn't have medication in stock    Next visit: 2.13.24

## 2024-01-31 DIAGNOSIS — F41.8 DEPRESSION WITH ANXIETY: ICD-10-CM

## 2024-01-31 RX ORDER — TRAZODONE HYDROCHLORIDE 50 MG/1
50 TABLET ORAL NIGHTLY
Qty: 90 TABLET | Refills: 1 | Status: SHIPPED | OUTPATIENT
Start: 2024-01-31 | End: 2024-03-04 | Stop reason: SDUPTHER

## 2024-01-31 RX ORDER — OLANZAPINE 2.5 MG/1
2.5 TABLET ORAL DAILY
Qty: 90 TABLET | Refills: 1 | Status: SHIPPED | OUTPATIENT
Start: 2024-01-31 | End: 2024-02-13 | Stop reason: SDUPTHER

## 2024-02-13 ENCOUNTER — OFFICE VISIT (OUTPATIENT)
Dept: BEHAVIORAL HEALTH | Facility: CLINIC | Age: 79
End: 2024-02-13
Payer: COMMERCIAL

## 2024-02-13 VITALS — SYSTOLIC BLOOD PRESSURE: 116 MMHG | DIASTOLIC BLOOD PRESSURE: 70 MMHG | HEART RATE: 88 BPM | TEMPERATURE: 96.5 F

## 2024-02-13 DIAGNOSIS — F41.8 DEPRESSION WITH ANXIETY: ICD-10-CM

## 2024-02-13 PROCEDURE — 3078F DIAST BP <80 MM HG: CPT | Performed by: PSYCHIATRY & NEUROLOGY

## 2024-02-13 PROCEDURE — 1159F MED LIST DOCD IN RCRD: CPT | Performed by: PSYCHIATRY & NEUROLOGY

## 2024-02-13 PROCEDURE — 1126F AMNT PAIN NOTED NONE PRSNT: CPT | Performed by: PSYCHIATRY & NEUROLOGY

## 2024-02-13 PROCEDURE — 1036F TOBACCO NON-USER: CPT | Performed by: PSYCHIATRY & NEUROLOGY

## 2024-02-13 PROCEDURE — 3074F SYST BP LT 130 MM HG: CPT | Performed by: PSYCHIATRY & NEUROLOGY

## 2024-02-13 PROCEDURE — 99213 OFFICE O/P EST LOW 20 MIN: CPT | Performed by: PSYCHIATRY & NEUROLOGY

## 2024-02-13 RX ORDER — OLANZAPINE 2.5 MG/1
2.5 TABLET ORAL DAILY
Qty: 90 TABLET | Refills: 1 | Status: SHIPPED | OUTPATIENT
Start: 2024-02-13 | End: 2024-06-04 | Stop reason: SDUPTHER

## 2024-02-13 NOTE — PROGRESS NOTES
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 Seen in office for follow up visit. He is seeking transfer of care; he was previously seeing Dr. Foy.     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 He says he does not want to retire but if he does not get a marin, he may be forced to.     He says he is trying to stop watching the news.     He has had trouble getting Armodafinil due to pharmacy not being able to get it. He says he went for a week without it and did not notice a significant difference.     Denies anhedonia.   Appetite - suppressed on Ozempic and has lost some weight.   He says he feels tired a lot.   He says his sleep is interrupted.   Denies any death wishes or suicidal thoughts, intent or plans.     Reports longstanding anxiety and worries.   No panic attacks.     OCD - Used to have OCD in the past. No significant compulsions and obsessions recently.     Denies any manic or hypomanic episodes.      Denies AVH, paranoia or delusions.     He talks to someone weekly from [place] for psychotherapy (Florencia Grant). He says he has always had difficulties with speaking up, and worrying about his abilities. He says that even as a kid, he used to get anxious about exams.       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.     Current medications:   Venlafaxine ER 150mg once daily  Olanzapine 2.5mg at bedtime. He says he stopped olanzapine once after a psychologist advised to do so and his mood became really bad.   Trazodone 50mg two tablets at bedtime  Armodafinil 50mg two tablets every morning - helps with motivation.      Past medications: " "  Fluoxetine - on it for a while.   Amitriptyline - had visual side effects.       Psychiatric Review Of Systems:   As above.       Medical Review Of Systems:    Pertinent items are noted in HPI.    Record Review: brief     Mental Status Evaluation:  Appearance: Fair grooming.   Behavior/Attitude: Cooperative. Pleasant.   Motor: Psychomotor activity in average range. No abnormal involuntary movements.   Gait: Normal.  Speech: Regular in rate, tone and volume. No pressure.  Mood: \"okay\"  Affect: Congruent to stated mood. Mobilized appropriately. Normal range.   Thought process: Goal-directed. Linear. Organized.  Thought content: No paranoia, delusion or ideas of reference elicited. No hallucinations in auditory, visual or other sensory modalities.   Suicidal ideation: denied.  Homicidal ideation: denied.   Insight: Fair.  Judgment: Fair.  Recent and remote memory: intact based on recall of recent and remote autobiographical memories.  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 Seen in office for follow up visit.      Initial impression:   8/1/2023 - He has a long history of depression and anxiety which seem fairly well-controlled on current medication regimen. He also had OCD symptoms in the past but has not had significant symptoms recently.     Labs:   8/2/23 - Low Vit D; low normal Vit B12; normal folate, TSH. Has been taking Vit D and Vit B12 supplements.     2/13/24 - Stable mood. Chronic anxiety. Inadvertently was off armodafinil for a week and did not notice a significant difference.      Diagnosis:   Major depressive disorder, recurrent, in partial remission  Other specified anxiety disorder  H/O OCD.     Treatment Plan/Recommendations:   - Can try to lower " armodafinil to 50mg in the morning. Can increase back to 100mg in the morning in case of any new or worsening symptoms.   - Continue other medications.   - Continue Vit D and Vit B12 supplementation.   - Continue psychotherapy.   - Provided supportive therapy.   - Encouraged to follow up with using CPAP for sleep apnea.  - Follow up with me in 3 months.       Review with patient: Treatment plan reviewed with the patient.    Mohini Tello MD

## 2024-02-13 NOTE — PATIENT INSTRUCTIONS
Plan:   - Can try to lower armodafinil to 50mg in the morning. If you notice any new or recurrent symptoms, then can increase back to 100mg in the morning.   - Continue other medications.    - Continue Vit D and Vit B12 supplementation.   - Continue psychotherapy.   - Please follow up with using CPAP for sleep apnea.  - Follow up with me in 3 months.  - Contact via [employer] or call sooner ([phone]) in case of any questions or concerns.  - Call [phone] for mental health crisis or suicidal thoughts, or call 911 or go to the nearest emergency room for emergencies.

## 2024-02-19 ENCOUNTER — APPOINTMENT (OUTPATIENT)
Dept: BEHAVIORAL HEALTH | Facility: CLINIC | Age: 79
End: 2024-02-19
Payer: COMMERCIAL

## 2024-02-27 ENCOUNTER — APPOINTMENT (OUTPATIENT)
Dept: BEHAVIORAL HEALTH | Facility: CLINIC | Age: 79
End: 2024-02-27
Payer: COMMERCIAL

## 2024-03-01 ENCOUNTER — TELEPHONE (OUTPATIENT)
Dept: BEHAVIORAL HEALTH | Facility: CLINIC | Age: 79
End: 2024-03-01
Payer: COMMERCIAL

## 2024-03-01 DIAGNOSIS — F41.8 DEPRESSION WITH ANXIETY: ICD-10-CM

## 2024-03-04 RX ORDER — TRAZODONE HYDROCHLORIDE 50 MG/1
TABLET ORAL
Qty: 180 TABLET | Refills: 1 | Status: SHIPPED | OUTPATIENT
Start: 2024-03-04

## 2024-03-14 DIAGNOSIS — E11.9 TYPE 2 DIABETES MELLITUS WITHOUT COMPLICATION, WITHOUT LONG-TERM CURRENT USE OF INSULIN (MULTI): ICD-10-CM

## 2024-03-14 RX ORDER — SEMAGLUTIDE 0.68 MG/ML
0.5 INJECTION, SOLUTION SUBCUTANEOUS
Qty: 9 ML | Refills: 3 | Status: SHIPPED
Start: 2024-03-14 | End: 2024-06-07 | Stop reason: SDUPTHER

## 2024-03-21 ENCOUNTER — OFFICE VISIT (OUTPATIENT)
Dept: ENDOCRINOLOGY | Facility: CLINIC | Age: 79
End: 2024-03-21
Payer: COMMERCIAL

## 2024-03-21 ENCOUNTER — LAB (OUTPATIENT)
Dept: LAB | Facility: LAB | Age: 79
End: 2024-03-21
Payer: COMMERCIAL

## 2024-03-21 VITALS
RESPIRATION RATE: 16 BRPM | HEART RATE: 76 BPM | BODY MASS INDEX: 27.86 KG/M2 | SYSTOLIC BLOOD PRESSURE: 110 MMHG | WEIGHT: 163.2 LBS | HEIGHT: 64 IN | DIASTOLIC BLOOD PRESSURE: 76 MMHG

## 2024-03-21 DIAGNOSIS — E78.5 HYPERLIPIDEMIA, UNSPECIFIED HYPERLIPIDEMIA TYPE: ICD-10-CM

## 2024-03-21 DIAGNOSIS — I10 HYPERTENSION, UNSPECIFIED TYPE: ICD-10-CM

## 2024-03-21 DIAGNOSIS — E11.9 TYPE 2 DIABETES MELLITUS WITHOUT COMPLICATION, WITHOUT LONG-TERM CURRENT USE OF INSULIN (MULTI): ICD-10-CM

## 2024-03-21 DIAGNOSIS — Z00.00 HEALTHCARE MAINTENANCE: ICD-10-CM

## 2024-03-21 DIAGNOSIS — E11.9 TYPE 2 DIABETES MELLITUS WITHOUT COMPLICATION, WITHOUT LONG-TERM CURRENT USE OF INSULIN (MULTI): Primary | ICD-10-CM

## 2024-03-21 LAB
ALBUMIN SERPL BCP-MCNC: 4.1 G/DL (ref 3.4–5)
ALP SERPL-CCNC: 49 U/L (ref 33–136)
ALT SERPL W P-5'-P-CCNC: 10 U/L (ref 10–52)
ANION GAP SERPL CALC-SCNC: 16 MMOL/L (ref 10–20)
AST SERPL W P-5'-P-CCNC: 15 U/L (ref 9–39)
BILIRUB SERPL-MCNC: 0.6 MG/DL (ref 0–1.2)
BUN SERPL-MCNC: 16 MG/DL (ref 6–23)
CALCIUM SERPL-MCNC: 9.7 MG/DL (ref 8.6–10.6)
CHLORIDE SERPL-SCNC: 98 MMOL/L (ref 98–107)
CHOLEST SERPL-MCNC: 126 MG/DL (ref 0–199)
CHOLESTEROL/HDL RATIO: 2.9
CO2 SERPL-SCNC: 30 MMOL/L (ref 21–32)
CREAT SERPL-MCNC: 0.98 MG/DL (ref 0.5–1.3)
CREAT UR-MCNC: 122.8 MG/DL (ref 20–370)
EGFRCR SERPLBLD CKD-EPI 2021: 78 ML/MIN/1.73M*2
ERYTHROCYTE [DISTWIDTH] IN BLOOD BY AUTOMATED COUNT: 12.8 % (ref 11.5–14.5)
EST. AVERAGE GLUCOSE BLD GHB EST-MCNC: 140 MG/DL
GLUCOSE SERPL-MCNC: 137 MG/DL (ref 74–99)
HBA1C MFR BLD: 6.5 %
HCT VFR BLD AUTO: 42.2 % (ref 41–52)
HDLC SERPL-MCNC: 43.1 MG/DL
HGB BLD-MCNC: 13.7 G/DL (ref 13.5–17.5)
LDLC SERPL CALC-MCNC: 57 MG/DL
MCH RBC QN AUTO: 31 PG (ref 26–34)
MCHC RBC AUTO-ENTMCNC: 32.5 G/DL (ref 32–36)
MCV RBC AUTO: 96 FL (ref 80–100)
MICROALBUMIN UR-MCNC: 10.3 MG/L
MICROALBUMIN/CREAT UR: 8.4 UG/MG CREAT
NON HDL CHOLESTEROL: 83 MG/DL (ref 0–149)
NRBC BLD-RTO: 0 /100 WBCS (ref 0–0)
PLATELET # BLD AUTO: 187 X10*3/UL (ref 150–450)
POTASSIUM SERPL-SCNC: 4.5 MMOL/L (ref 3.5–5.3)
PROT SERPL-MCNC: 7 G/DL (ref 6.4–8.2)
RBC # BLD AUTO: 4.42 X10*6/UL (ref 4.5–5.9)
SODIUM SERPL-SCNC: 139 MMOL/L (ref 136–145)
TRIGL SERPL-MCNC: 129 MG/DL (ref 0–149)
VLDL: 26 MG/DL (ref 0–40)
WBC # BLD AUTO: 6.7 X10*3/UL (ref 4.4–11.3)

## 2024-03-21 PROCEDURE — 80053 COMPREHEN METABOLIC PANEL: CPT

## 2024-03-21 PROCEDURE — 80061 LIPID PANEL: CPT

## 2024-03-21 PROCEDURE — 83036 HEMOGLOBIN GLYCOSYLATED A1C: CPT

## 2024-03-21 PROCEDURE — 3078F DIAST BP <80 MM HG: CPT | Performed by: INTERNAL MEDICINE

## 2024-03-21 PROCEDURE — 1036F TOBACCO NON-USER: CPT | Performed by: INTERNAL MEDICINE

## 2024-03-21 PROCEDURE — 82043 UR ALBUMIN QUANTITATIVE: CPT

## 2024-03-21 PROCEDURE — 36415 COLL VENOUS BLD VENIPUNCTURE: CPT

## 2024-03-21 PROCEDURE — 1160F RVW MEDS BY RX/DR IN RCRD: CPT | Performed by: INTERNAL MEDICINE

## 2024-03-21 PROCEDURE — 1159F MED LIST DOCD IN RCRD: CPT | Performed by: INTERNAL MEDICINE

## 2024-03-21 PROCEDURE — 99214 OFFICE O/P EST MOD 30 MIN: CPT | Performed by: INTERNAL MEDICINE

## 2024-03-21 PROCEDURE — 3074F SYST BP LT 130 MM HG: CPT | Performed by: INTERNAL MEDICINE

## 2024-03-21 PROCEDURE — 82570 ASSAY OF URINE CREATININE: CPT

## 2024-03-21 PROCEDURE — 85027 COMPLETE CBC AUTOMATED: CPT

## 2024-03-21 RX ORDER — TAMSULOSIN HYDROCHLORIDE 0.4 MG/1
0.8 CAPSULE ORAL DAILY
Qty: 90 CAPSULE | Refills: 3 | Status: SHIPPED | OUTPATIENT
Start: 2024-03-21

## 2024-03-21 ASSESSMENT — ENCOUNTER SYMPTOMS
FEVER: 0
DIARRHEA: 0
SHORTNESS OF BREATH: 0
NAUSEA: 0
CHILLS: 0
DIZZINESS: 0
VOMITING: 0
LIGHT-HEADEDNESS: 0

## 2024-03-21 NOTE — PROGRESS NOTES
Endocrinology: Follow up visit  Subjective   Patient ID: Farooq Hendrix is a 78 y.o. male who presents for Diabetes (Type 2 ), Hyperlipidemia, and Hypertension.    PCP: No primary care provider on file.    HPI  Since last visit continued to do well on ozmepic + metformin.  Sugars are great when he checks.  Still working on grants and papers.  Feeling well.  Working with a  lately.  Utd with eye exam    Review of Systems   Constitutional:  Negative for chills and fever.   Respiratory:  Negative for shortness of breath.    Gastrointestinal:  Negative for diarrhea, nausea and vomiting.   Endocrine: Negative for cold intolerance and heat intolerance.   Neurological:  Negative for dizziness and light-headedness.       Patient Active Problem List   Diagnosis    Allergic rhinitis    Allergic urticaria    Benign prostate hyperplasia    Bilateral impacted cerumen    Impacted cerumen of left ear    Depression with anxiety    Dermatitis    Type 2 diabetes mellitus with diabetic polyneuropathy, without long-term current use of insulin (CMS/HCC)    Dilated aortic root (CMS/HCC)    Ejaculatory disorder    Male sexual dysfunction    Erectile dysfunction    Fatigue    Gynecomastia    Hyperlipidemia    Hypertension    Hypogonadism male    Insomnia    Hip discomfort    Lateral pain of left hip    Left anterior fascicular block (LAFB)    Low back pain    Obstructive sleep apnea syndrome, severe    Partial degenerative rupture of right biceps tendon    Sensorineural hearing loss, bilateral    Sleep disorder, circadian, jet lag type    Trochanteric bursitis of left hip    Urinary frequency    Urinary leakage    Vitamin D deficiency    Other viral warts    Rash and nonspecific skin eruption    Pain    Type 2 diabetes mellitus (CMS/HCC)    Recurrent major depression-severe (CMS/HCC)    Senile nuclear sclerosis    Controlled type 2 diabetes mellitus without complication, without long-term current use of insulin (CMS/HCC)    Mood  disorder (CMS/HCC)    Anxiety    Personal history of other malignant neoplasm of skin    Malignant neoplasm (CMS/HCC)    Xerosis cutis    Other seborrheic keratosis    Keratosis    Follicular disorder    Actinic keratosis    Intertrigo    Nummular dermatitis    Pruritus    Allergic contact dermatitis    Seborrheic dermatitis    Tinea pedis    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)    Neoplasm of uncertain behavior of skin    Basal cell carcinoma    Candidiasis of nail        Home Meds:  Current Outpatient Medications   Medication Instructions    armodafinil (NUVIGIL) 50 mg, oral, 2 times daily    atorvastatin (LIPITOR) 10 mg, oral, Daily    ciclopirox (Loprox) 0.77 % cream APPLY TO AFFECTED AREA IN GROIN TWICE A DAY    docusate sodium (COLACE) 100 mg, oral, 2 times daily    fluticasone (Flonase) 50 mcg/actuation nasal spray 2 sprays, Each Nostril, Daily    lisinopril 10 mg, oral, Daily    metFORMIN (GLUCOPHAGE) 1,000 mg, oral, 2 times daily    OLANZapine (ZYPREXA) 2.5 mg, oral, Daily    Ozempic 0.5 mg, subcutaneous, Weekly    sildenafil (VIAGRA) 100 mg, oral, Daily RT    tamsulosin (FLOMAX) 0.8 mg, oral, Daily    traZODone (Desyrel) 50 mg tablet Take one to two tablets at bedtime    triamcinolone (Kenalog) 0.1 % cream APPLY SPARINGLY TO AFFECTED AREA(S) 2 TO 3 TIMES DAILY.    venlafaxine 150 mg, oral, Daily        Allergies   Allergen Reactions    Animal Dander Unknown        Objective   Vitals:    03/21/24 0823   BP: 110/76   Pulse: 76   Resp: 16      Vitals:    03/21/24 0823   Weight: 74 kg (163 lb 3.2 oz)      Body mass index is 28.01 kg/m².   Physical Exam  Constitutional:       Appearance: Normal appearance. He is overweight.   HENT:      Head: Normocephalic and atraumatic.   Neck:      Thyroid: No thyroid mass, thyromegaly or thyroid tenderness.   Cardiovascular:      Rate and Rhythm: Normal rate and regular rhythm.      Heart sounds: No murmur heard.     No gallop.   Pulmonary:      Effort: Pulmonary  "effort is normal.      Breath sounds: Normal breath sounds.   Abdominal:      Palpations: Abdomen is soft.      Comments: benign   Neurological:      General: No focal deficit present.      Mental Status: He is alert and oriented to person, place, and time.      Deep Tendon Reflexes: Reflexes are normal and symmetric.   Psychiatric:         Behavior: Behavior is cooperative.         Labs:  Lab Results   Component Value Date    HGBA1C 7.1 (A) 04/03/2023    TSH 1.09 08/02/2023    FREET4 1.14 06/11/2019      No results found for: \"PR1\", \"THYROIDPAB\", \"TSI\"     Assessment/Plan   Problem List Items Addressed This Visit       Hyperlipidemia    Hypertension    Type 2 diabetes mellitus (CMS/HCC) - Primary    Relevant Orders    Comprehensive Metabolic Panel    CBC    Lipid Panel    Hemoglobin A1C    Albumin , Urine Random     Dm2:  Fasting labs today  Doing great  No changes  Utd with eye exam  Htn:  Bp excellent  Lipids:  On statin: due for recheck  Follow up in 6 months    Electronically signed by:  Felicitas German MD 03/21/24 9:04 AM              "

## 2024-04-04 ENCOUNTER — OFFICE VISIT (OUTPATIENT)
Dept: PRIMARY CARE | Facility: CLINIC | Age: 79
End: 2024-04-04
Payer: COMMERCIAL

## 2024-04-04 VITALS
BODY MASS INDEX: 26.8 KG/M2 | OXYGEN SATURATION: 95 % | DIASTOLIC BLOOD PRESSURE: 79 MMHG | TEMPERATURE: 97.1 F | HEIGHT: 64 IN | SYSTOLIC BLOOD PRESSURE: 125 MMHG | WEIGHT: 157 LBS | HEART RATE: 68 BPM

## 2024-04-04 DIAGNOSIS — Z00.00 HEALTHCARE MAINTENANCE: Primary | ICD-10-CM

## 2024-04-04 DIAGNOSIS — E11.42 TYPE 2 DIABETES MELLITUS WITH DIABETIC POLYNEUROPATHY, WITHOUT LONG-TERM CURRENT USE OF INSULIN (MULTI): ICD-10-CM

## 2024-04-04 DIAGNOSIS — F33.2 SEVERE EPISODE OF RECURRENT MAJOR DEPRESSIVE DISORDER, WITHOUT PSYCHOTIC FEATURES (MULTI): ICD-10-CM

## 2024-04-04 DIAGNOSIS — E78.5 HYPERLIPIDEMIA, UNSPECIFIED HYPERLIPIDEMIA TYPE: ICD-10-CM

## 2024-04-04 DIAGNOSIS — C80.1 MALIGNANT NEOPLASM (MULTI): ICD-10-CM

## 2024-04-04 DIAGNOSIS — N52.9 ERECTILE DYSFUNCTION, UNSPECIFIED ERECTILE DYSFUNCTION TYPE: ICD-10-CM

## 2024-04-04 DIAGNOSIS — Z00.00 MEDICARE ANNUAL WELLNESS VISIT, SUBSEQUENT: ICD-10-CM

## 2024-04-04 DIAGNOSIS — F41.8 DEPRESSION WITH ANXIETY: ICD-10-CM

## 2024-04-04 DIAGNOSIS — G47.33 OBSTRUCTIVE SLEEP APNEA SYNDROME, SEVERE: ICD-10-CM

## 2024-04-04 DIAGNOSIS — N40.1 BENIGN PROSTATIC HYPERPLASIA WITH LOWER URINARY TRACT SYMPTOMS, SYMPTOM DETAILS UNSPECIFIED: ICD-10-CM

## 2024-04-04 DIAGNOSIS — I10 HYPERTENSION, UNSPECIFIED TYPE: ICD-10-CM

## 2024-04-04 PROBLEM — L30.4 INTERTRIGO: Status: RESOLVED | Noted: 2019-02-19 | Resolved: 2024-04-04

## 2024-04-04 PROBLEM — G47.00 INSOMNIA: Status: RESOLVED | Noted: 2023-02-23 | Resolved: 2024-04-04

## 2024-04-04 PROBLEM — N53.9 MALE SEXUAL DYSFUNCTION: Status: RESOLVED | Noted: 2023-02-23 | Resolved: 2024-04-04

## 2024-04-04 PROBLEM — D48.5 NEOPLASM OF UNCERTAIN BEHAVIOR OF SKIN: Status: RESOLVED | Noted: 2018-03-01 | Resolved: 2024-04-04

## 2024-04-04 PROBLEM — R21 RASH AND NONSPECIFIC SKIN ERUPTION: Status: RESOLVED | Noted: 2017-02-14 | Resolved: 2024-04-04

## 2024-04-04 PROBLEM — H90.3 SENSORINEURAL HEARING LOSS, BILATERAL: Status: RESOLVED | Noted: 2023-02-23 | Resolved: 2024-04-04

## 2024-04-04 PROBLEM — L50.0 ALLERGIC URTICARIA: Status: RESOLVED | Noted: 2023-02-23 | Resolved: 2024-04-04

## 2024-04-04 PROBLEM — Z85.828 PERSONAL HISTORY OF OTHER MALIGNANT NEOPLASM OF SKIN: Status: RESOLVED | Noted: 2018-06-13 | Resolved: 2024-04-04

## 2024-04-04 PROBLEM — N62 GYNECOMASTIA: Status: RESOLVED | Noted: 2023-02-23 | Resolved: 2024-04-04

## 2024-04-04 PROBLEM — L57.0 KERATOSIS: Status: RESOLVED | Noted: 2017-02-23 | Resolved: 2024-04-04

## 2024-04-04 PROBLEM — L29.9 PRURITUS: Status: RESOLVED | Noted: 2017-02-14 | Resolved: 2024-04-04

## 2024-04-04 PROBLEM — L30.9 DERMATITIS: Status: RESOLVED | Noted: 2023-02-23 | Resolved: 2024-04-04

## 2024-04-04 PROBLEM — J30.9 ALLERGIC RHINITIS: Status: RESOLVED | Noted: 2023-02-23 | Resolved: 2024-04-04

## 2024-04-04 PROBLEM — B07.8 OTHER VIRAL WARTS: Status: RESOLVED | Noted: 2018-10-09 | Resolved: 2024-04-04

## 2024-04-04 PROBLEM — E29.1 HYPOGONADISM MALE: Status: RESOLVED | Noted: 2023-02-23 | Resolved: 2024-04-04

## 2024-04-04 PROBLEM — G47.25 SLEEP DISORDER, CIRCADIAN, JET LAG TYPE: Status: RESOLVED | Noted: 2023-02-23 | Resolved: 2024-04-04

## 2024-04-04 PROBLEM — N53.19 EJACULATORY DISORDER: Status: RESOLVED | Noted: 2023-02-23 | Resolved: 2024-04-04

## 2024-04-04 PROBLEM — M54.50 LOW BACK PAIN: Status: RESOLVED | Noted: 2023-02-23 | Resolved: 2024-04-04

## 2024-04-04 PROBLEM — R32 URINARY LEAKAGE: Status: RESOLVED | Noted: 2023-02-23 | Resolved: 2024-04-04

## 2024-04-04 PROBLEM — S46.111A: Status: RESOLVED | Noted: 2023-02-23 | Resolved: 2024-04-04

## 2024-04-04 PROBLEM — H61.22 IMPACTED CERUMEN OF LEFT EAR: Status: RESOLVED | Noted: 2023-02-23 | Resolved: 2024-04-04

## 2024-04-04 PROBLEM — M25.552 LATERAL PAIN OF LEFT HIP: Status: RESOLVED | Noted: 2023-02-23 | Resolved: 2024-04-04

## 2024-04-04 PROBLEM — I44.4 LEFT ANTERIOR FASCICULAR BLOCK (LAFB): Status: RESOLVED | Noted: 2023-02-23 | Resolved: 2024-04-04

## 2024-04-04 PROBLEM — R52 PAIN: Status: RESOLVED | Noted: 2019-03-19 | Resolved: 2024-04-04

## 2024-04-04 PROBLEM — L57.0 ACTINIC KERATOSIS: Status: RESOLVED | Noted: 2017-02-23 | Resolved: 2024-04-04

## 2024-04-04 PROBLEM — H61.23 BILATERAL IMPACTED CERUMEN: Status: RESOLVED | Noted: 2023-02-23 | Resolved: 2024-04-04

## 2024-04-04 PROBLEM — M25.559 HIP DISCOMFORT: Status: RESOLVED | Noted: 2023-02-23 | Resolved: 2024-04-04

## 2024-04-04 PROBLEM — E55.9 VITAMIN D DEFICIENCY: Status: RESOLVED | Noted: 2023-02-23 | Resolved: 2024-04-04

## 2024-04-04 PROBLEM — R35.0 URINARY FREQUENCY: Status: RESOLVED | Noted: 2023-02-23 | Resolved: 2024-04-04

## 2024-04-04 PROBLEM — L73.9 FOLLICULAR DISORDER: Status: RESOLVED | Noted: 2019-06-11 | Resolved: 2024-04-04

## 2024-04-04 PROBLEM — I77.810 DILATED AORTIC ROOT (CMS-HCC): Status: RESOLVED | Noted: 2023-02-23 | Resolved: 2024-04-04

## 2024-04-04 PROBLEM — B35.3 TINEA PEDIS: Status: RESOLVED | Noted: 2019-02-19 | Resolved: 2024-04-04

## 2024-04-04 PROBLEM — L23.9 ALLERGIC CONTACT DERMATITIS: Status: RESOLVED | Noted: 2019-09-25 | Resolved: 2024-04-04

## 2024-04-04 PROBLEM — R53.83 FATIGUE: Status: RESOLVED | Noted: 2023-02-23 | Resolved: 2024-04-04

## 2024-04-04 PROBLEM — M70.62 TROCHANTERIC BURSITIS OF LEFT HIP: Status: RESOLVED | Noted: 2023-02-23 | Resolved: 2024-04-04

## 2024-04-04 PROCEDURE — 1123F ACP DISCUSS/DSCN MKR DOCD: CPT | Performed by: STUDENT IN AN ORGANIZED HEALTH CARE EDUCATION/TRAINING PROGRAM

## 2024-04-04 PROCEDURE — 1036F TOBACCO NON-USER: CPT | Performed by: STUDENT IN AN ORGANIZED HEALTH CARE EDUCATION/TRAINING PROGRAM

## 2024-04-04 PROCEDURE — 3074F SYST BP LT 130 MM HG: CPT | Performed by: STUDENT IN AN ORGANIZED HEALTH CARE EDUCATION/TRAINING PROGRAM

## 2024-04-04 PROCEDURE — 1170F FXNL STATUS ASSESSED: CPT | Performed by: STUDENT IN AN ORGANIZED HEALTH CARE EDUCATION/TRAINING PROGRAM

## 2024-04-04 PROCEDURE — 1160F RVW MEDS BY RX/DR IN RCRD: CPT | Performed by: STUDENT IN AN ORGANIZED HEALTH CARE EDUCATION/TRAINING PROGRAM

## 2024-04-04 PROCEDURE — 1159F MED LIST DOCD IN RCRD: CPT | Performed by: STUDENT IN AN ORGANIZED HEALTH CARE EDUCATION/TRAINING PROGRAM

## 2024-04-04 PROCEDURE — 99397 PER PM REEVAL EST PAT 65+ YR: CPT | Performed by: STUDENT IN AN ORGANIZED HEALTH CARE EDUCATION/TRAINING PROGRAM

## 2024-04-04 PROCEDURE — G0439 PPPS, SUBSEQ VISIT: HCPCS | Performed by: STUDENT IN AN ORGANIZED HEALTH CARE EDUCATION/TRAINING PROGRAM

## 2024-04-04 PROCEDURE — 3078F DIAST BP <80 MM HG: CPT | Performed by: STUDENT IN AN ORGANIZED HEALTH CARE EDUCATION/TRAINING PROGRAM

## 2024-04-04 PROCEDURE — 1158F ADVNC CARE PLAN TLK DOCD: CPT | Performed by: STUDENT IN AN ORGANIZED HEALTH CARE EDUCATION/TRAINING PROGRAM

## 2024-04-04 ASSESSMENT — PATIENT HEALTH QUESTIONNAIRE - PHQ9
2. FEELING DOWN, DEPRESSED OR HOPELESS: NOT AT ALL
1. LITTLE INTEREST OR PLEASURE IN DOING THINGS: NOT AT ALL
SUM OF ALL RESPONSES TO PHQ9 QUESTIONS 1 AND 2: 0

## 2024-04-04 ASSESSMENT — ACTIVITIES OF DAILY LIVING (ADL)
GROCERY_SHOPPING: INDEPENDENT
DOING_HOUSEWORK: INDEPENDENT
BATHING: INDEPENDENT
DRESSING: INDEPENDENT
TAKING_MEDICATION: INDEPENDENT
MANAGING_FINANCES: INDEPENDENT

## 2024-04-04 NOTE — PROGRESS NOTES
"Subjective   Reason for Visit: Farooq Hendrix is an 78 y.o. male here for a Medicare Wellness visit.     Past Medical, Surgical, and Family History reviewed and updated in chart.    Reviewed all medications by prescribing practitioner or clinical pharmacist (such as prescriptions, OTCs, herbal therapies and supplements) and documented in the medical record.    HPI    Re: DM2- see endocrine notes, A1c at goal.  Now on Ozempic (GLP-1) and has lost ~23 pounds. Patient denies symptomatic highs or lows with regards to their glucose. Reports no polyuria, polydipsia, fatigue, vision changes. Reports longstanding neuropathy in feet that is mild. Reports fair foot hygiene, denies knowledge of any foot ulcers or trauma.      Re: ED - stable. Doing well on current regimen.      Re: psych - depression is severe, currently well controlled. On olanzapine, armodafinil, and venlafaxine. Meets with psych regularly (Dr. Tello).     Re: CV - CV risk factors well controlled. Tolerating his BP medication and his statin very well. BP at goal, LDL < 60.  Reports no sx high or low from HTN; denies blurry vision, HA, dizziness LoC CP SoB Goldstein and leg swelling     Re: SOILA - severe. Does not use CPAP.      Re: HM - CRS UTD, no longer required. PSA no longer required. His tetanus shot is due, will be done at pharmacy for cost reasons.      PMHx, FHx, Social Hx, Surg Hx personally reviewed at this appointment. No pertinent findings and/or changes from prior (if applicable).     ROS: Denies wt gain/loss f/c HA LoC CP SOB NVDC. See HPI above, and scanned sheet (if applicable). All other systems are reviewed and are without complaint.     Patient Care Team:  Bran Shin MD as PCP - General (Internal Medicine)  Bran Shin MD as PCP - MMO ACO PCP     Review of Systems    Objective   Vitals:  /79   Pulse 68   Temp 36.2 °C (97.1 °F)   Ht 1.626 m (5' 4\")   Wt 71.2 kg (157 lb)   SpO2 95%   BMI 26.95 kg/m²       Physical " Exam  Gen: NAD. AAO x3.  HEENT: NC/AT. Anicteric sclera, symmetric pupils. MMM no thrush. Bilateral hearing aides.   Neck: Soft, supple. No LAD. No goiter.  CV: RRR nl s1s2 no m/r/g  Pulm: CTAB no w/r/r, good air exchange  GI: soft NTND BS+ no hsm  Ext: WWP no edema  Neuro: II-XII grossly intact, nonfocal systemic findings  MSK: 5/5 strength b/l UE and LE  Gait: unremarkable   Feet: no ulcers, nl monofilament testing     Lab Results   Component Value Date    WBC 6.7 03/21/2024    HGB 13.7 03/21/2024    HCT 42.2 03/21/2024     03/21/2024    CHOL 126 03/21/2024    TRIG 129 03/21/2024    HDL 43.1 03/21/2024    ALT 10 03/21/2024    AST 15 03/21/2024     03/21/2024    K 4.5 03/21/2024    CL 98 03/21/2024    CREATININE 0.98 03/21/2024    BUN 16 03/21/2024    CO2 30 03/21/2024    TSH 1.09 08/02/2023    PSA 0.53 08/10/2020    HGBA1C 6.5 (H) 03/21/2024     par    ELECTROCARDIOGRAM RHYTHM STRIP  Ordered by an unspecified provider.       Current Outpatient Medications   Medication Instructions    armodafinil (NUVIGIL) 50 mg, oral, 2 times daily    atorvastatin (LIPITOR) 10 mg, oral, Daily    ciclopirox (Loprox) 0.77 % cream APPLY TO AFFECTED AREA IN GROIN TWICE A DAY    docusate sodium (COLACE) 100 mg, oral, 2 times daily    fluticasone (Flonase) 50 mcg/actuation nasal spray 2 sprays, Each Nostril, Daily    lisinopril 10 mg, oral, Daily    metFORMIN (GLUCOPHAGE) 1,000 mg, oral, 2 times daily    OLANZapine (ZYPREXA) 2.5 mg, oral, Daily    Ozempic 0.5 mg, subcutaneous, Weekly    sildenafil (VIAGRA) 100 mg, oral, Daily RT    tamsulosin (FLOMAX) 0.8 mg, oral, Daily    traZODone (Desyrel) 50 mg tablet Take one to two tablets at bedtime    triamcinolone (Kenalog) 0.1 % cream APPLY SPARINGLY TO AFFECTED AREA(S) 2 TO 3 TIMES DAILY.    venlafaxine 150 mg, oral, Daily        Assessment/Plan   Chronic issues are well managed.    # HTN: at/near goal  - continue current regimen  - routine lab work if not recent  - continue  lifestyle modifications    # hyperlipidemia: stable  - lipid panel, ASCVD+ score based on these values if age appropriate  - continue statin     # DM2: at goal and lost significant weight with Ozempic  - continue current meds  - Routine Diabetic labs  - counselled on wt loss, diet, exercise, and lifestyle modifications  - yearly foot exams, eye exams     # Severe depression: in partial remission  - continue current multidrug regimen  - follow up psychology    # SOILA: severe  - recommend he use his CPAP machine    # Health Maintenance  - routine blood work  - Colon Cancer Screening: no longer indicated due to age   - PSA: no longer indicated due to age   - Immunizations: tetanus shot at pharmacy  - AAA screening:  not indicated       Problem List Items Addressed This Visit       Benign prostate hyperplasia    Depression with anxiety    Type 2 diabetes mellitus with diabetic polyneuropathy, without long-term current use of insulin (CMS/HCC)    Overview     31Mar2022 Bran Shin  Chronic Condition Documentation: Stable based on symptoms and exam. Continue established treatment plan and follow up at least yearly         Erectile dysfunction    Hyperlipidemia    Hypertension    Obstructive sleep apnea syndrome, severe    Recurrent major depression-severe (CMS/HCC)    Malignant neoplasm (CMS/HCC)     Other Visit Diagnoses       Healthcare maintenance    -  Primary    Medicare annual wellness visit, subsequent

## 2024-04-04 NOTE — PATIENT INSTRUCTIONS
Your blood work is up to date; no need for additional draw at this appointment    Your medications are up to date today, I will renew them when a refill is due.    Follow up with your specialists as previously scheduled.     I recommend you use your CPAP machine.    I recommend the following shots:  Tetanus at your pharmacy    See me yearly for a Medicare Wellness Visit, and sooner as needed for sick visits

## 2024-04-25 ENCOUNTER — OFFICE VISIT (OUTPATIENT)
Dept: OTOLARYNGOLOGY | Facility: CLINIC | Age: 79
End: 2024-04-25
Payer: COMMERCIAL

## 2024-04-25 VITALS — WEIGHT: 162.8 LBS | TEMPERATURE: 97 F | HEIGHT: 64 IN | BODY MASS INDEX: 27.79 KG/M2

## 2024-04-25 DIAGNOSIS — H61.23 BILATERAL IMPACTED CERUMEN: Primary | ICD-10-CM

## 2024-04-25 DIAGNOSIS — H90.3 SENSORINEURAL HEARING LOSS (SNHL) OF BOTH EARS: ICD-10-CM

## 2024-04-25 PROCEDURE — 1160F RVW MEDS BY RX/DR IN RCRD: CPT | Performed by: NURSE PRACTITIONER

## 2024-04-25 PROCEDURE — 1159F MED LIST DOCD IN RCRD: CPT | Performed by: NURSE PRACTITIONER

## 2024-04-25 PROCEDURE — 69210 REMOVE IMPACTED EAR WAX UNI: CPT | Performed by: NURSE PRACTITIONER

## 2024-04-25 PROCEDURE — 99212 OFFICE O/P EST SF 10 MIN: CPT | Performed by: NURSE PRACTITIONER

## 2024-04-25 PROCEDURE — 1036F TOBACCO NON-USER: CPT | Performed by: NURSE PRACTITIONER

## 2024-04-25 RX ORDER — SODIUM FLUORIDE 5 MG/G
PASTE, DENTIFRICE DENTAL
COMMUNITY
Start: 2024-04-09

## 2024-04-25 ASSESSMENT — PATIENT HEALTH QUESTIONNAIRE - PHQ9
2. FEELING DOWN, DEPRESSED OR HOPELESS: NOT AT ALL
1. LITTLE INTEREST OR PLEASURE IN DOING THINGS: NOT AT ALL
SUM OF ALL RESPONSES TO PHQ9 QUESTIONS 1 & 2: 0

## 2024-04-25 NOTE — PROGRESS NOTES
Subjective   Patient ID: Farooq Hendrix is a 78 y.o. male who presents for Cerumen Impaction.  HPI patient is here for routine ear cleaning follow-up.   Patient has a known bilateral sensorineural hearing loss and wears bilateral hearing aids.  He reports that he gains benefits with the hearing aids.  In the interim there is no ear infection.  He denies having any drainage or pain today.    Review of Systems      All other systems have been reviewed and are negative for complaints except for those mentioned in history of present illness, past medical history and problem list       Objective   Physical Exam  CONSTITUTIONAL: No acute distress, normal facial features; No fever; no chills  VOICE: No hoarseness or other audible abnormality  RESPIRATION: Breathing comfortably, no stridor; normal breathing effort  CV: No cyanosis visible on the face and neck area  EYES:Pupils equal and round ; no erythema; conjunctiva clear; sclera white  NEURO: Alert and oriented, able to raise eyebrows symmetrical bilateral, smile with no facial droop, able to swallow  HEAD AND FACE: Symmetric facial features, no masses or lesions    Right ear examination: External ear normal.  EAC with impacted cerumen.  TM not visualized.  Left ear examination: External ear normal.  EAC with impacted cerumen.  TM not visualized.    NOSE: External nose midline  ORAL CAVITY: No lesions of external lips  NECK/LYMPH: No obvious deformity or lesions; trachea is midline  PSYCH: Alert and oriented with appropriate mood and affect       Patient ID: Farooq Hendrix is a 78 y.o. male.    Procedures  Cerumen removal    Consent:  The planned procedure is discussed including possible risk, benefits and alternative treatments reviewed.  Verbal consent is obtained.    Indications:Obstructed cerumen is noted affecting hearing and causing discomfort.    Procedure: The ears are examined microscopically.  Using speculum, alligator, #7, suction the obstructive  cerumen in both the ears were removed.    Findings: Cerumen and epithelial debris obstruction in both external auditory canals.  Inspection of tympanic membrane after cleaning showed intact with no effusion, retraction or perforation.    Post procedure: The patient tolerated the procedure well without complications     Assessment/Plan       Problem List Items Addressed This Visit    None  Visit Diagnoses       Bilateral impacted cerumen    -  Primary    Sensorineural hearing loss (SNHL) of both ears             This patient presents for evaluation of cerumen impaction. The ear/s cleaned using microscope and instruments.  Patient tolerated the procedure well. Inspection of TM after cleaning showed intact and WNL.  Patient was provided instructions on ear care for cerumen in the discussion summary. Patient may follow up as needed for repeat cleaning or for all other ENT concerns.  All questions were answered to patient's satisfaction.

## 2024-05-14 ENCOUNTER — OFFICE VISIT (OUTPATIENT)
Dept: BEHAVIORAL HEALTH | Facility: CLINIC | Age: 79
End: 2024-05-14
Payer: COMMERCIAL

## 2024-05-14 DIAGNOSIS — F33.41 RECURRENT MAJOR DEPRESSIVE DISORDER, IN PARTIAL REMISSION (CMS-HCC): ICD-10-CM

## 2024-05-14 DIAGNOSIS — F41.8 OTHER SPECIFIED ANXIETY DISORDERS: ICD-10-CM

## 2024-05-14 PROCEDURE — 1159F MED LIST DOCD IN RCRD: CPT | Performed by: PSYCHIATRY & NEUROLOGY

## 2024-05-14 PROCEDURE — 1160F RVW MEDS BY RX/DR IN RCRD: CPT | Performed by: PSYCHIATRY & NEUROLOGY

## 2024-05-14 PROCEDURE — 99213 OFFICE O/P EST LOW 20 MIN: CPT | Performed by: PSYCHIATRY & NEUROLOGY

## 2024-05-14 NOTE — PATIENT INSTRUCTIONS
Plan:   - Continue other medications:   Venlafaxine ER 150mg once daily  Olanzapine 2.5mg at bedtime  Armodafinil 100mg daily  Trazodone 50mg two tablets at bedtime  - Continue Vit D and Vit B12 supplementation.   - Continue psychotherapy.   - Please follow up with appointment for CPAP for sleep apnea.  - Follow up with me in 3 months.   - Contact via [employer] or call sooner ([phone]) in case of any questions or concerns.  - Call [phone] for mental health crisis or suicidal thoughts, or call 911 or go to the nearest emergency room for emergencies.

## 2024-05-14 NOTE — PROGRESS NOTES
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 Seen in office for follow up visit.     Subjective:   He says he is \"pretty good.\"     He says they are having a celebration for his birthday tomorrow - there is a party and a concert. He says it is \"baffling\" that he is 79; he says he feels like he is in his 30s.     He says he hired a  and does it once a week. He says it has been great. He feels getting stronger.     He says he tried to lower Armodafinil but felt the symptoms got worse, so he went back to 100mg/day.     Denies anhedonia.   Appetite - suppressed on Ozempic; lost significant weight in the last year.   He says his energy seems better with working with a .   He says his sleep is better. Has an appointment to start with new CPAP machine.   Denies any death wishes or suicidal thoughts, intent or plans.     Reports longstanding anxiety and worries but denies any worsening.    No panic attacks.     OCD - Used to have OCD in the past. No significant compulsions and obsessions recently.     Denies any manic or hypomanic episodes.      Denies AVH, paranoia or delusions.     He talks to someone weekly from [place] on Zoom for psychotherapy (Florencia Grant).      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 He says he has not heard about his marin. He is also working on some papers. He says he has a couple of recurring dreams about not being able to find where he parked his car. He says this happened once when he was at a mall. He says he has longstanding issues with self-esteem. He says he has dreams about not getting tenure at [employer].   His son is in town (lives in [place]); he wants to talk to his son about some things although he is hesitant.     Current medications:   Venlafaxine ER 150mg once daily  Olanzapine 2.5mg at bedtime. He says he stopped olanzapine once after a " "psychologist advised to do so and his mood became really bad.   Trazodone 50mg two tablets at bedtime  Armodafinil 50mg two tablets every morning - helps with motivation.      Past medications:   Fluoxetine - on it for a while.   Amitriptyline - had visual side effects.     Psychiatric Review Of Systems:   As above.       Medical Review Of Systems:    Pertinent items are noted in HPI.    Record Review: brief     Mental Status Evaluation:  Appearance: Fair grooming.   Behavior/Attitude: Cooperative. Pleasant.   Motor: Psychomotor activity in average range. No abnormal involuntary movements.   Gait: Normal.  Speech: Regular in rate, tone and volume. No pressure.  Mood: \"pretty good.\"  Affect: Congruent to stated mood. Mobilized appropriately. Normal range.   Thought process: Goal-directed. Linear. Organized.  Thought content: No paranoia, delusion or ideas of reference elicited. No hallucinations in auditory, visual or other sensory modalities.   Suicidal ideation: denied.  Homicidal ideation: denied.   Insight: Fair.  Judgment: Fair.  Recent and remote memory: intact based on recall of recent and remote autobiographical memories.  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 Seen in office for follow up visit.      Initial impression:   8/1/2023 - He has a long history of depression and anxiety which seem fairly well-controlled on current medication regimen. He also had OCD symptoms in the past but has not had significant symptoms recently.     Labs:   8/2/23 - Low Vit D; low normal Vit B12; normal folate, TSH. Has been taking Vit D and Vit B12 supplements.     5/14/24 - Stable mood. Chronic anxiety. Inadvertently was off armodafinil for a week and did not notice a " significant difference.      Diagnosis:   Major depressive disorder, recurrent, in partial remission  Other specified anxiety disorder  H/O OCD.     Treatment Plan/Recommendations:   - Continue other medications:   Venlafaxine ER 150mg once daily  Olanzapine 2.5mg at bedtime  Armodafinil 100mg daily  Trazodone 50mg two tablets at bedtime  - Continue Vit D and Vit B12 supplementation.   - Continue psychotherapy.   - Provided supportive therapy.   - Encouraged to follow up with using CPAP for sleep apnea.  - Follow up with me in 3 months.       Review with patient: Treatment plan reviewed with the patient.    Mohini Tello MD.

## 2024-05-20 DIAGNOSIS — F41.8 DEPRESSION WITH ANXIETY: ICD-10-CM

## 2024-05-20 RX ORDER — VENLAFAXINE HYDROCHLORIDE 150 MG/1
150 TABLET, EXTENDED RELEASE ORAL DAILY
Qty: 90 TABLET | Refills: 1 | Status: SHIPPED | OUTPATIENT
Start: 2024-05-20

## 2024-06-04 DIAGNOSIS — F41.8 DEPRESSION WITH ANXIETY: ICD-10-CM

## 2024-06-04 RX ORDER — OLANZAPINE 2.5 MG/1
2.5 TABLET ORAL DAILY
Qty: 90 TABLET | Refills: 1 | Status: SHIPPED | OUTPATIENT
Start: 2024-06-04

## 2024-06-07 DIAGNOSIS — E11.9 TYPE 2 DIABETES MELLITUS WITHOUT COMPLICATION, WITHOUT LONG-TERM CURRENT USE OF INSULIN (MULTI): ICD-10-CM

## 2024-06-07 RX ORDER — SEMAGLUTIDE 0.68 MG/ML
0.5 INJECTION, SOLUTION SUBCUTANEOUS
Qty: 9 ML | Refills: 3 | Status: SHIPPED | OUTPATIENT
Start: 2024-06-09 | End: 2025-06-09

## 2024-07-01 DIAGNOSIS — F41.8 DEPRESSION WITH ANXIETY: ICD-10-CM

## 2024-07-01 DIAGNOSIS — I10 HYPERTENSION, UNSPECIFIED TYPE: ICD-10-CM

## 2024-07-01 DIAGNOSIS — E11.42 TYPE 2 DIABETES MELLITUS WITH DIABETIC POLYNEUROPATHY, WITHOUT LONG-TERM CURRENT USE OF INSULIN (MULTI): ICD-10-CM

## 2024-07-01 RX ORDER — LISINOPRIL 10 MG/1
10 TABLET ORAL DAILY
Qty: 90 TABLET | Refills: 3 | Status: SHIPPED | OUTPATIENT
Start: 2024-07-01 | End: 2025-07-01

## 2024-07-01 RX ORDER — ARMODAFINIL 50 MG/1
50 TABLET ORAL 2 TIMES DAILY
Qty: 180 TABLET | Refills: 0 | Status: SHIPPED | OUTPATIENT
Start: 2024-07-01

## 2024-07-02 ENCOUNTER — TELEMEDICINE (OUTPATIENT)
Dept: UROLOGY | Facility: HOSPITAL | Age: 79
End: 2024-07-02
Payer: COMMERCIAL

## 2024-07-02 DIAGNOSIS — N52.8 OTHER MALE ERECTILE DYSFUNCTION: Primary | ICD-10-CM

## 2024-07-02 PROCEDURE — 99213 OFFICE O/P EST LOW 20 MIN: CPT | Performed by: UROLOGY

## 2024-07-02 NOTE — PROGRESS NOTES
FUV    Virtual or Telephone Consent    An interactive audio and video telecommunication system which permits real time communications between the patient (at the originating site) and provider (at the distant site) was utilized to provide this telehealth service.   Verbal consent was requested and obtained from Farooq Hendrix on this date, 07/02/24 for a telehealth visit.      Last seen - 7/2/21     HISTORY OF PRESENT ILLNESS:   Farooq Hendrix is a 79 y.o. male who is being seen today for fuv.      Struggling with ED.  Has given up.  Low libido.  Asking about some online products.      PAST MEDICAL HISTORY:  Past Medical History:   Diagnosis Date    Allergic rhinitis 02/23/2023    Bicipital tendinitis, right shoulder 10/06/2019    Biceps tendinitis of right upper extremity    Bilateral impacted cerumen 02/23/2023    Candidiasis of nail 02/24/2016    Dilated aortic root (CMS-[facility]) 02/23/2023    Hypogonadism male 02/23/2023    Major depressive disorder, recurrent, unspecified (CMS-HCC) 03/25/2021    Depression, major, recurrent    Personal history of diseases of the blood and blood-forming organs and certain disorders involving the immune mechanism 12/16/2016    History of iron deficiency anemia    Personal history of other endocrine, nutritional and metabolic disease     History of type 2 diabetes mellitus    Personal history of other endocrine, nutritional and metabolic disease     History of hyperlipidemia    Personal history of other mental and behavioral disorders     History of suicidal ideation    Personal history of other mental and behavioral disorders     History of depression     PAST SURGICAL HISTORY:  Past Surgical History:   Procedure Laterality Date    COLONOSCOPY  06/04/2013    Complete Colonoscopy    HERNIA REPAIR  10/08/2014    Hernia Repair     ALLERGIES:   Allergies   Allergen Reactions    Animal Dander Unknown     MEDICATIONS:   Current Outpatient Medications   Medication Instructions     atorvastatin (LIPITOR) 10 mg, oral, Daily    ciclopirox (Loprox) 0.77 % cream APPLY TO AFFECTED AREA IN GROIN TWICE A DAY    docusate sodium (COLACE) 100 mg, oral, 2 times daily    fluoride, sodium, (Prevident 5000 Plus) 1.1 % dental cream PLEASE SEE ATTACHED FOR DETAILED DIRECTIONS    fluticasone (Flonase) 50 mcg/actuation nasal spray 2 sprays, Each Nostril, Daily    lisinopril 10 mg, oral, Daily    metFORMIN (GLUCOPHAGE) 1,000 mg, oral, 2 times daily    NuvigiL 50 mg, oral, 2 times daily    OLANZapine (ZYPREXA) 2.5 mg, oral, Daily    Ozempic 0.5 mg, subcutaneous, Once Weekly    sildenafil (VIAGRA) 100 mg, oral, Daily RT    tamsulosin (FLOMAX) 0.8 mg, oral, Daily    traZODone (Desyrel) 50 mg tablet Take one to two tablets at bedtime    triamcinolone (Kenalog) 0.1 % cream APPLY SPARINGLY TO AFFECTED AREA(S) 2 TO 3 TIMES DAILY.    venlafaxine 150 mg, oral, Daily        PHYSICAL EXAM:  Constitutional: Patient appears well-developed and well-nourished. No distress.      Labs  Lab Results   Component Value Date    TESTOSTERONE 822 03/04/2021    TESTOSTERONE 776 08/10/2020    TESTOSTERONE 732 01/22/2020     Lab Results   Component Value Date    PSA 0.53 08/10/2020     Lab Results   Component Value Date    PROLACTIN 9.2 05/08/2019     Lab Results   Component Value Date    GFRMALE 73 04/03/2023     Lab Results   Component Value Date    CREATININE 0.98 03/21/2024     Lab Results   Component Value Date    CHOL 126 03/21/2024     Lab Results   Component Value Date    HDL 43.1 03/21/2024     Lab Results   Component Value Date    CHHDL 2.9 03/21/2024     Lab Results   Component Value Date    LDLF 73 04/03/2023     Lab Results   Component Value Date    VLDL 26 03/21/2024     Lab Results   Component Value Date    TRIG 129 03/21/2024     Lab Results   Component Value Date    HGBA1C 6.5 (H) 03/21/2024     Lab Results   Component Value Date    TESTF 33.4 03/27/2019     Lab Results   Component Value Date    HCT 42.2 03/21/2024        Imaging    Procedures      Assessment:      1. Other male erectile dysfunction          Farooq Hendrix is a 79 y.o. male here for FUV     Plan:   Erectile Dysfunction  Patient was seen today with the complaint of erectile dysfunction (ED).  I went over the definition of ED, which is the inability to obtain or maintain an erection sufficient for satisfactory sexual activity.   I outlined that erectile function is a complex interplay of neural, vascular, hormonal and psychological factors.  Disruption in any of these pathways may lead to ED.    We discussed the role of Penile Doppler.  It involves an injection of a vasodilator (Trimix) to induce an erection, then using an ultrasound probe we assess the blood flow to the penis, how well the penis traps the blood (venous leak), as well as the presence of plaques, calcifications, or curvature of the penis. He is to consider this option.    In terms of treatment options; PDE5i (Viagra, Cialis, etc.), intracavernosal injections (ICI), vacuum erection devices (JEFFRY) and penile implants were discussed in detail.      Warned about online products

## 2024-07-08 ENCOUNTER — OFFICE VISIT (OUTPATIENT)
Dept: PRIMARY CARE | Facility: CLINIC | Age: 79
End: 2024-07-08
Payer: COMMERCIAL

## 2024-07-08 VITALS
WEIGHT: 161 LBS | DIASTOLIC BLOOD PRESSURE: 78 MMHG | OXYGEN SATURATION: 96 % | TEMPERATURE: 97.3 F | HEIGHT: 64 IN | HEART RATE: 56 BPM | SYSTOLIC BLOOD PRESSURE: 124 MMHG | BODY MASS INDEX: 27.49 KG/M2

## 2024-07-08 DIAGNOSIS — M54.50 CHRONIC BILATERAL LOW BACK PAIN WITHOUT SCIATICA: Primary | ICD-10-CM

## 2024-07-08 DIAGNOSIS — G89.29 CHRONIC BILATERAL LOW BACK PAIN WITHOUT SCIATICA: Primary | ICD-10-CM

## 2024-07-08 DIAGNOSIS — E11.42 TYPE 2 DIABETES MELLITUS WITH DIABETIC POLYNEUROPATHY, WITHOUT LONG-TERM CURRENT USE OF INSULIN (MULTI): ICD-10-CM

## 2024-07-08 LAB — POC HEMOGLOBIN A1C: 6.3 % (ref 4.2–6.5)

## 2024-07-08 PROCEDURE — 3078F DIAST BP <80 MM HG: CPT | Performed by: STUDENT IN AN ORGANIZED HEALTH CARE EDUCATION/TRAINING PROGRAM

## 2024-07-08 PROCEDURE — 99214 OFFICE O/P EST MOD 30 MIN: CPT | Performed by: STUDENT IN AN ORGANIZED HEALTH CARE EDUCATION/TRAINING PROGRAM

## 2024-07-08 PROCEDURE — 83036 HEMOGLOBIN GLYCOSYLATED A1C: CPT | Performed by: STUDENT IN AN ORGANIZED HEALTH CARE EDUCATION/TRAINING PROGRAM

## 2024-07-08 PROCEDURE — 1159F MED LIST DOCD IN RCRD: CPT | Performed by: STUDENT IN AN ORGANIZED HEALTH CARE EDUCATION/TRAINING PROGRAM

## 2024-07-08 PROCEDURE — 1036F TOBACCO NON-USER: CPT | Performed by: STUDENT IN AN ORGANIZED HEALTH CARE EDUCATION/TRAINING PROGRAM

## 2024-07-08 PROCEDURE — 1126F AMNT PAIN NOTED NONE PRSNT: CPT | Performed by: STUDENT IN AN ORGANIZED HEALTH CARE EDUCATION/TRAINING PROGRAM

## 2024-07-08 PROCEDURE — 3074F SYST BP LT 130 MM HG: CPT | Performed by: STUDENT IN AN ORGANIZED HEALTH CARE EDUCATION/TRAINING PROGRAM

## 2024-07-08 PROCEDURE — 1160F RVW MEDS BY RX/DR IN RCRD: CPT | Performed by: STUDENT IN AN ORGANIZED HEALTH CARE EDUCATION/TRAINING PROGRAM

## 2024-07-08 ASSESSMENT — PAIN SCALES - GENERAL: PAINLEVEL: 0-NO PAIN

## 2024-07-08 NOTE — PROGRESS NOTES
"Subjective   Patient ID: Farooq Hendrix is a 79 y.o. male who presents for No chief complaint on file..    HPI   Today's focus    Re: back - Subacute to chronic onset of low back pain. Mild-moderate in intensity, locks up on occasion. Pain is in lumbar area, with no radiation into the buttock. Used some conservative therapy (NSAIDs, stretching, heat/ice, etc...) but without regularity. Has not yet tried physical therapy.     Re: DM2- well controlled, A1c at goal! Doing well on GLP-1. Patient denies symptomatic highs or lows with regards to their glucose. Reports no polyuria, polydipsia, fatigue, vision changes, and stocking/glove neuropathy. Reports fair foot hygiene, denies knowledge of any foot ulcers or trauma.     Other chronic issues    Re: psych - depression is severe, currently well controlled. On olanzapine, armodafinil, and venlafaxine. Meets with psych regularly (Dr. Tello).     Re: CV - CV risk factors well controlled. Tolerating his BP medication and his statin very well. BP at goal, LDL < 60.  Reports no sx high or low from HTN; denies blurry vision, HA, dizziness LoC CP SoB Goldstein and leg swelling      Re: SOILA - severe. Does not use CPAP.      Re: HM - CRS UTD, no longer required. PSA no longer required. His tetanus shot is due, will be done at pharmacy for cost reasons.      PMHx, FHx, Social Hx, Surg Hx personally reviewed at this appointment. No pertinent findings and/or changes from prior (if applicable).     ROS: Denies wt gain/loss f/c HA LoC CP SOB NVDC. See HPI above, and scanned sheet (if applicable). All other systems are reviewed and are without complaint.         Review of Systems    Objective   /78   Pulse 56   Temp 36.3 °C (97.3 °F)   Ht 1.626 m (5' 4\")   Wt 73 kg (161 lb)   SpO2 96%   BMI 27.64 kg/m²     Physical Exam  Gen: well appearing, NAD. AAO x 3.  HEENT: NC/AT. Symmetric pupils  CV: RRR nl s1s2 no m/r/g  Pulm: CTAB no w/r/r  GI: soft NTND  Neuro: grossly " nonfocal  MSK: Mild lumbar paraspinal tightness. Otherwise unremarkable.   Gait: unremarkable       Assessment/Plan   Chronic issues are well managed. Back issues are not new, will start with therapy    # back strain  - conservative management with PT, massage therapy, acupuncture and topical creams if amenable  - recommend weight loss with diet and exercise  - trial of medrol dosepak or PRN cyclobenzaprine if necessary  - if above ineffective, will re-evaluate and consider imaging at that time     # DM2: Stable, A1c at/near goal  - continue current meds  - Routine Diabetic labs  - counselled on wt loss, diet, exercise, and lifestyle modifications  - yearly foot exams, eye exams       # HTN: at/near goal  - continue current regimen  - routine lab work if not recent  - continue lifestyle modifications     # hyperlipidemia: stable  - lipid panel, ASCVD+ score based on these values if age appropriate  - continue statin      # 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

## 2024-07-08 NOTE — PATIENT INSTRUCTIONS
Your blood work is up to date; no need for additional draw at this appointment    You are dealing with a back strain. The mainstay of treatment is conservative management with ice/heat, stretching, physical therapy, and acupuncture or massage therapy. Given the degree of pain you're having, I may have provided you with a short course of steroids or a small supply of a muscle relaxant. There is likely no role for imaging (x-ray, CT scan, MRI, etc...) at this time; if you aren't better with the above treatments, return to clinic and I will reconsider.     I have referred you to physical therapy to help with your back pain. Please call [phone] to schedule at a convenient  location.

## 2024-07-25 ENCOUNTER — OFFICE VISIT (OUTPATIENT)
Dept: SLEEP MEDICINE | Facility: HOSPITAL | Age: 79
End: 2024-07-25
Payer: COMMERCIAL

## 2024-07-25 VITALS
BODY MASS INDEX: 27.81 KG/M2 | WEIGHT: 162 LBS | HEART RATE: 91 BPM | SYSTOLIC BLOOD PRESSURE: 122 MMHG | DIASTOLIC BLOOD PRESSURE: 75 MMHG | OXYGEN SATURATION: 97 % | TEMPERATURE: 97.8 F

## 2024-07-25 DIAGNOSIS — I10 HYPERTENSION, UNSPECIFIED TYPE: ICD-10-CM

## 2024-07-25 DIAGNOSIS — G47.33 OSA (OBSTRUCTIVE SLEEP APNEA): Primary | ICD-10-CM

## 2024-07-25 DIAGNOSIS — G47.33 OBSTRUCTIVE SLEEP APNEA SYNDROME, SEVERE: ICD-10-CM

## 2024-07-25 DIAGNOSIS — F41.8 DEPRESSION WITH ANXIETY: ICD-10-CM

## 2024-07-25 PROCEDURE — 99214 OFFICE O/P EST MOD 30 MIN: CPT | Performed by: INTERNAL MEDICINE

## 2024-07-25 PROCEDURE — 3078F DIAST BP <80 MM HG: CPT | Performed by: INTERNAL MEDICINE

## 2024-07-25 PROCEDURE — 1126F AMNT PAIN NOTED NONE PRSNT: CPT | Performed by: INTERNAL MEDICINE

## 2024-07-25 PROCEDURE — 1036F TOBACCO NON-USER: CPT | Performed by: INTERNAL MEDICINE

## 2024-07-25 PROCEDURE — 3074F SYST BP LT 130 MM HG: CPT | Performed by: INTERNAL MEDICINE

## 2024-07-25 PROCEDURE — 1159F MED LIST DOCD IN RCRD: CPT | Performed by: INTERNAL MEDICINE

## 2024-07-25 ASSESSMENT — PAIN SCALES - GENERAL: PAINLEVEL: 0-NO PAIN

## 2024-07-25 ASSESSMENT — LIFESTYLE VARIABLES: HOW OFTEN DO YOU HAVE A DRINK CONTAINING ALCOHOL: MONTHLY OR LESS

## 2024-07-25 NOTE — PATIENT INSTRUCTIONS
Continue using your CPAP and try your new mask. You will receive a home sleep test kit in the mail and will need to do the test without your CPAP to see if you still have sleep related breathing problems. Reduce your trazodone to half of normal dose (1 tab) and see if it it helps shorten your sleep time as desired. Return to clinic for follow up in 2 months.

## 2024-07-25 NOTE — PROGRESS NOTES
Reviewed the prior history and current management for sleep apnea and lng sleep. 3 issues today cut Tranzaadone in half Not sure the origins of that me. Check untreated SOILA as he has lost weight, try to use PAP regularly.    Discussed PSG and MSLT     I have personally seen the patient and reviewed the chart. I agree with the assessment and plan.    Lg Collins MD

## 2024-07-25 NOTE — PROGRESS NOTES
Patient: Farooq Hendrix    [id number]  : 1945 -- AGE 79 y.o.    Provider: Amy Valencia MD     Location [facility], [place], operated by [facility]   Service Date: 2024              [facility]  Followup Visit Note      HISTORY OF PRESENT ILLNESS     The patient's referring provider is: n/a    HISTORY OF PRESENT ILLNESS   Farooq Hendrix is a 79 y.o. male who presents to a [facility] for followup.     The patient  has a past medical history of Allergic rhinitis (2023), Bicipital tendinitis, right shoulder (10/06/2019), Bilateral impacted cerumen (2023), Candidiasis of nail (2016), Dilated aortic root (CMS-[facility]) (2023), Hypogonadism male (2023), Major depressive disorder, recurrent, unspecified (CMS-HCC) (2021), Personal history of diseases of the blood and blood-forming organs and certain disorders involving the immune mechanism (2016), Personal history of other endocrine, nutritional and metabolic disease, Personal history of other endocrine, nutritional and metabolic disease, Personal history of other mental and behavioral disorders, and Personal history of other mental and behavioral disorders..    PAST SLEEP HISTORY  Last seen in this clinic in 2021  He has been using DreamStation 2 and a large nasal mask has not received supplies since 2021.  His initial diagnostic sleep study showed AHI of 35.      CURRENT HISTORY  He has been compliant with his CPAP therapy until Jluis recall and has stopped using it for over a year and resumed a few weeks ago however he believes the tubing does not fit right and he is not sure if his settings are correct either since he lost weight.  He does not report any worsening of his sleep apnea symptoms while off the device.  His CPAP download today shows very limited use however when he does use a his AHI is reduced to 4 events per hour with average  pressures of 8 in the range of 6.8-10.8.  Patient endorses sleeping for 10 to 12 hours and using his CPAP for 4 to 6 hours on average.   On today's visit, the patient reports concerns over sleeping too long.  He reports that he normally sleeps 12 hours on weekends if not restricted and sleeps less during the workdays when he has to wake up to go to work.  He does not take naps and does not feel excessively sleepy at work.  He does not report bothersome nighttime awakenings.  Is no concerns about his sleep latency.  He may forget to put his CPAP back on when he wakes up to go to the bathroom.  He has been taking trazodone 100 mg at bedtime.     ESS: 16   CB 16      REVIEW OF SYSTEMS     REVIEW OF SYSTEMS  All negative except as stated above    ALLERGIES AND MEDICATIONS     ALLERGIES  Allergies   Allergen Reactions    Animal Dander Unknown       MEDICATIONS: He has a current medication list which includes the following prescription(s): nuvigil - TAKE 1 TABLET BY MOUTH TWICE A DAY, atorvastatin - Take 1 tablet (10 mg) by mouth once daily, docusate sodium - Take 1 capsule (100 mg) by mouth 2 times a day, fluoride (sodium) - PLEASE SEE ATTACHED FOR DETAILED DIRECTIONS, fluticasone - ADMINISTER 2 SPRAYS INTO EACH NOSTRIL ONCE DAILY, lisinopril - Take 1 tablet (10 mg) by mouth once daily, metformin - Take 1 tablet (1,000 mg) by mouth 2 times a day, olanzapine - Take 1 tablet (2.5 mg) by mouth once daily, ozempic - Inject 0.5 mg under the skin 1 (one) time per week, tamsulosin - Take 2 capsules (0.8 mg) by mouth once daily, trazodone - Take one to two tablets at bedtime, venlafaxine - TAKE 1 TABLET BY MOUTH ONCE A DAY, ciclopirox - APPLY TO AFFECTED AREA IN GROIN TWICE A DAY, sildenafil - Take 1 tablet (100 mg) by mouth once daily (Patient not taking: Reported on 7/25/2024), and triamcinolone - APPLY SPARINGLY TO AFFECTED AREA(S) 2 TO 3 TIMES DAILY.    PAST MEDICAL HISTORY : He  has a past medical history of Allergic  rhinitis (02/23/2023), Bicipital tendinitis, right shoulder (10/06/2019), Bilateral impacted cerumen (02/23/2023), Candidiasis of nail (02/24/2016), Dilated aortic root (CMS-HCC) (02/23/2023), Hypogonadism male (02/23/2023), Major depressive disorder, recurrent, unspecified (CMS-HCC) (03/25/2021), Personal history of diseases of the blood and blood-forming organs and certain disorders involving the immune mechanism (12/16/2016), Personal history of other endocrine, nutritional and metabolic disease, Personal history of other endocrine, nutritional and metabolic disease, Personal history of other mental and behavioral disorders, and Personal history of other mental and behavioral disorders.    PAST SURGICAL HISTORY: He  has a past surgical history that includes Colonoscopy (06/04/2013) and Hernia repair (10/08/2014).     FAMILY HISTORY: No changes since previous visit. Otherwise non-contributory as charted.       SOCIAL HISTORY  He  reports that he has never smoked. He has never used smokeless tobacco. He reports current alcohol use. He reports that he does not use drugs.       PHYSICAL EXAM     VITAL SIGNS: /75   Pulse 91   Temp 36.6 °C (97.8 °F)   Wt 73.5 kg (162 lb)   SpO2 97% Comment: RA  BMI 27.81 kg/m²      CURRENT WEIGHT: [unfilled]  BMI: [unfilled]  PREVIOUS WEIGHTS:  Wt Readings from Last 3 Encounters:   07/25/24 73.5 kg (162 lb)   07/08/24 73 kg (161 lb)   04/25/24 73.8 kg (162 lb 12.8 oz)       Physical Exam  General: well appearing, no acute distress  ENT: No nasopharyngeal edema, turbinate hypertrophy or deviated septum. No retrognathia.   Neck: Supple, soft  Cardiac: RSR  Resp: normal resp effort  Neuro: alert, gait normal        RESULTS/DATA     Iron (ug/dL)   Date Value   12/29/2017 83     Iron Saturation (%)   Date Value   12/29/2017 28     TIBC (ug/dL)   Date Value   12/29/2017 295     Ferritin (ug/L)   Date Value   12/29/2017 140       PAP Adherence  A PAP adherence download was obtained and  data was reviewed personally today in clinic. (see scanned document in EPIC)  AHI is reduced to 4 events per hour with average pressures of 8 in the range of 6.8-10.8.  Patient endorses sleeping for 10 to 12 hours and using his CPAP for 4 to 6 hours on average.     ASSESSMENT/PLAN     Mr. Hendrix is a 79 y.o. male and  has a past medical history of Allergic rhinitis (02/23/2023), Bicipital tendinitis, right shoulder (10/06/2019), Bilateral impacted cerumen (02/23/2023), Candidiasis of nail (02/24/2016), Dilated aortic root (CMS-HCC) (02/23/2023), Hypogonadism male (02/23/2023), Major depressive disorder, recurrent, unspecified (CMS-HCC) (03/25/2021), Personal history of diseases of the blood and blood-forming organs and certain disorders involving the immune mechanism (12/16/2016), Personal history of other endocrine, nutritional and metabolic disease, Personal history of other endocrine, nutritional and metabolic disease, Personal history of other mental and behavioral disorders, and Personal history of other mental and behavioral disorders. He returns in followup to the [facility] for their SOILA.      Problem List and Orders  Diagnoses and all orders for this visit:  SOILA (obstructive sleep apnea)  -     Positive Airway Pressure (PAP) Therapy  -     Home sleep apnea test (HSAT); Future  Hypertension, unspecified type  Obstructive sleep apnea syndrome, severe  Depression with anxiety      Recommendations/Plan:  - Continue using your CPAP   - new nasal mask Eson 2NM large provided today by [facility]; supplies refill order placed  - HSAT ordered; pt reminded to do the test without his CPAP to evaluate changes given his weight loss and subjective lack of sx without CPAP   - advised to reduce his trazodone to half of normal dose (1 tab) and see if it it helps shorten his TST as desired.   - Return to clinic for follow up in 2 months.

## 2024-08-05 DIAGNOSIS — F41.8 DEPRESSION WITH ANXIETY: ICD-10-CM

## 2024-08-05 RX ORDER — TRAZODONE HYDROCHLORIDE 50 MG/1
TABLET ORAL
Qty: 180 TABLET | Refills: 0 | Status: SHIPPED | OUTPATIENT
Start: 2024-08-05

## 2024-08-08 ENCOUNTER — APPOINTMENT (OUTPATIENT)
Dept: PHYSICAL THERAPY | Facility: HOSPITAL | Age: 79
End: 2024-08-08
Payer: COMMERCIAL

## 2024-08-12 ENCOUNTER — APPOINTMENT (OUTPATIENT)
Dept: PHYSICAL THERAPY | Facility: HOSPITAL | Age: 79
End: 2024-08-12
Payer: COMMERCIAL

## 2024-08-13 ENCOUNTER — APPOINTMENT (OUTPATIENT)
Dept: BEHAVIORAL HEALTH | Facility: CLINIC | Age: 79
End: 2024-08-13
Payer: COMMERCIAL

## 2024-08-13 DIAGNOSIS — F33.41 RECURRENT MAJOR DEPRESSIVE DISORDER, IN PARTIAL REMISSION (CMS-HCC): ICD-10-CM

## 2024-08-13 DIAGNOSIS — F41.8 OTHER SPECIFIED ANXIETY DISORDERS: ICD-10-CM

## 2024-08-13 PROCEDURE — 1160F RVW MEDS BY RX/DR IN RCRD: CPT | Performed by: PSYCHIATRY & NEUROLOGY

## 2024-08-13 PROCEDURE — 1159F MED LIST DOCD IN RCRD: CPT | Performed by: PSYCHIATRY & NEUROLOGY

## 2024-08-13 PROCEDURE — 99213 OFFICE O/P EST LOW 20 MIN: CPT | Performed by: PSYCHIATRY & NEUROLOGY

## 2024-08-13 NOTE — PROGRESS NOTES
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 Seen in office for follow up visit.     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 He says it has been difficult to get into it. He says he is finally getting into it.   He says he had an amazing birthday celebration.     Denies anhedonia.   Appetite - suppressed on Ozempic; has lost 25 lbs in the last year. He says he had to get new pants. He says he is at 155 lbs now.   He says he has been working with an \"elder\"  and it has been going well.   He says he is sleeping okay. He has decreased trazodone to 50mg at bedtime and has not noticed a difference. He has been using CPAP machine but notes that when he wakes up, he has removed it.   He has been somewhat more motivated lately.   Denies any death wishes or suicidal thoughts, intent or plans.     Reports longstanding anxiety and worries but says \"probably better.\"   No panic attacks.     OCD - Used to have OCD in the past. No significant compulsions and obsessions recently; says he may have \"rare episodes.\"     Denies any manic or hypomanic episodes.      Denies AVH, paranoia or delusions.     He talks to someone weekly from [place] on Zoom for psychotherapy (Florencia Grant).      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 He says it is unpredictable and if he does not get the marin, he would not be able to continue the lab. 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 He says that he is dealing with some issues with some staff at his lab.     His son is in town (lives in [place]); they are going to visit soon.     Current medications:   Venlafaxine ER 150mg once daily  Olanzapine 2.5mg at bedtime. He says " "he stopped olanzapine once after a psychologist advised to do so and his mood became really bad.   Trazodone 50mg one tablet at bedtime; decreased from 100mg.   Armodafinil 50mg two tablets every morning - helps with motivation. He says he tried to lower Armodafinil but felt the symptoms got worse, so he went back to 100mg/day.      Past medications:   Fluoxetine - on it for a while.   Amitriptyline - had visual side effects.     Psychiatric Review Of Systems:   As above.       Medical Review Of Systems:    Pertinent items are noted in HPI.    Record Review: brief     Mental Status Evaluation:  Appearance: Fair grooming.   Behavior/Attitude: Cooperative. Pleasant.   Motor: Psychomotor activity in average range. No abnormal involuntary movements.   Gait: Normal.  Speech: Regular in rate, tone and volume. No pressure.  Mood: \"okay.\"  Affect: Congruent to stated mood. Mobilized appropriately. Normal range.   Thought process: Goal-directed. Linear. Organized.  Thought content: No paranoia, delusion or ideas of reference elicited. No hallucinations in auditory, visual or other sensory modalities.   Suicidal ideation: denied.  Homicidal ideation: denied.   Insight: Fair.  Judgment: Fair.  Recent and remote memory: intact based on recall of recent and remote autobiographical memories.  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 Seen in office for follow up visit.      Initial impression:   8/1/2023 - He has a long history of depression and anxiety which seem fairly well-controlled on current medication regimen. He also had OCD symptoms in the past but has not had significant symptoms recently.     Labs:   8/2/23 - Low Vit D; low normal Vit B12; normal folate, TSH. Has been " taking Vit D and Vit B12 supplements.     8/13/24 - Stable mood; reports improved motivation. Chronic anxiety but reports some improvement recently.     Diagnosis:   Major depressive disorder, recurrent, in partial remission  Other specified anxiety disorder  H/O OCD.     Treatment Plan/Recommendations:   - Continue other medications:   Venlafaxine ER 150mg once daily  Olanzapine 2.5mg at bedtime  Armodafinil 100mg daily  Trazodone 50mg at bedtime  - Continue Vit D and Vit B12 supplementation.   - Continue psychotherapy.   - Provided supportive therapy.   - Continue using CPAP for sleep apnea.  - Follow up with me in 3 months.   - Contact sooner if needed.       Review with patient: Treatment plan reviewed with the patient.    Mohini Tello MD.

## 2024-08-13 NOTE — PATIENT INSTRUCTIONS
Plan:   - Continue other medications:   Venlafaxine ER 150mg once daily  Olanzapine 2.5mg at bedtime  Armodafinil 100mg daily  Trazodone 50mg at bedtime  - Continue Vit D and Vit B12 supplementation.   - Continue psychotherapy.   - Continue using CPAP for sleep apnea.  - Follow up with me in 3 months.   - Contact via "[employer] or call sooner ([phone]) in case of any questions or concerns.  - Call [phone] for mental health crisis or suicidal thoughts, or call 911 or go to the nearest emergency room for emergencies.

## 2024-08-19 DIAGNOSIS — E11.9 TYPE 2 DIABETES MELLITUS WITHOUT COMPLICATION, WITHOUT LONG-TERM CURRENT USE OF INSULIN (MULTI): ICD-10-CM

## 2024-08-19 RX ORDER — SEMAGLUTIDE 0.68 MG/ML
0.5 INJECTION, SOLUTION SUBCUTANEOUS
Qty: 9 ML | Refills: 3 | Status: SHIPPED | OUTPATIENT
Start: 2024-08-25 | End: 2025-08-25

## 2024-09-04 ENCOUNTER — EVALUATION (OUTPATIENT)
Dept: PHYSICAL THERAPY | Facility: HOSPITAL | Age: 79
End: 2024-09-04
Payer: COMMERCIAL

## 2024-09-04 DIAGNOSIS — M54.9 MID BACK PAIN: Primary | ICD-10-CM

## 2024-09-04 DIAGNOSIS — G89.29 CHRONIC BILATERAL LOW BACK PAIN WITHOUT SCIATICA: ICD-10-CM

## 2024-09-04 DIAGNOSIS — M54.50 CHRONIC BILATERAL LOW BACK PAIN WITHOUT SCIATICA: ICD-10-CM

## 2024-09-04 PROCEDURE — 97110 THERAPEUTIC EXERCISES: CPT | Mod: GP | Performed by: PHYSICAL THERAPIST

## 2024-09-04 PROCEDURE — 97162 PT EVAL MOD COMPLEX 30 MIN: CPT | Mod: GP | Performed by: PHYSICAL THERAPIST

## 2024-09-04 ASSESSMENT — ENCOUNTER SYMPTOMS
DEPRESSION: 0
OCCASIONAL FEELINGS OF UNSTEADINESS: 0
LOSS OF SENSATION IN FEET: 0

## 2024-09-04 NOTE — PROGRESS NOTES
Physical Therapy Evaluation and Treatment      Patient Name:Farooq Hendrix   MRN:[id number]   Today's Date:9/5/2024   Referred by: Bran Shin   Time Calculation  Start Time: 0815  Stop Time: 0910  Time Calculation (min): 55 min    PT Evaluation Time Entry  PT Evaluation (Moderate) Time Entry: 40  PT Therapeutic Procedures Time Entry  Therapeutic Exercise Time Entry: 10       Therapy Diagnosis:  Problem List Items Addressed This Visit             ICD-10-CM       Musculoskeletal and Injuries    Chronic bilateral low back pain without sciatica M54.50, G89.29    Relevant Orders    Follow Up In Physical Therapy    Mid back pain - Primary M54.9    Relevant Orders    Follow Up In Physical Therapy       Assessment:   80 yo male with intermittent onset of mid to lower back pain, usually associated with getting up in AM or after prolonged sitting, demonstrated increased T spine kyphosis, likely posture related along with degenerative changes, at this time, PT will focus on posture awareness, ROM and stretching, scapular, back and core stab program for long term sx management, pt is educated in HEP and agreed with plan.      Plan: Plan of care was developed with input and agreement by the patient.   Visit number: 1     Work on posture stretching and trunk rotation stretching, scapular and back strengthening, manual prn, core stab program for long term sx management.     PT Plan: Skilled PT  PT Frequency:  (1x/2 weeks)  Duration: 10 sessions  Onset Date: 07/08/24  Number of Treatments Authorized: MMO 30V  Rehab Potential: Good  Plan of Care Agreement: Patient    Current Problem:   1. Mid back pain  Follow Up In Physical Therapy      2. Chronic bilateral low back pain without sciatica  Referral to Physical Therapy    Follow Up In Physical Therapy          Subjective    General:  General  Reason for Referral: chronic LBP  Referred By: Bran Shin MD  Past Medical History Relevant to Rehab: HTN, DM  Preferred Learning  Style: verbal, visual, written  Precautions:  Precautions  DAVIDADI Fall Risk Score (The score of 4 or more indicates an increased risk of falling): 1  Precautions Comment: HTN, DM    HPI: ongoing varying intensity of LBP in the past, currently doing well, last seen by PCP while having significant pain, no radiating pain, located between scapula, sometimes in lower back, occasionally on L LE to mid calf, worse when getting up in AM, getting up from sitting, OK with sleeping (sidelying), eases up after getting up, OK with walking.  Pt was seen in PT in the past for LBP, last in clinic 2023.   Lost 15# recently with medication.    IMAGING: none recently.     PMH: DM, sleep apnea, HTN,     MEDICATIONS:  none.     SOCIAL HX/JOB STATUS: working fulltime as professor,  active life style.  Tries to walk stairs during the day.     BASELINE FUNCTION: working 1x/week with  stationary bike 15min, some light free weights 15min for the past 6 months,     Pain:     PAIN: current 0/10  Worst 8/10      Best 0/10  description and location of pain: center of lower back sometimes to mid back to scapaula, occasional to L LE as ache.    Pain aggravated by:getting up in AM and after prolonged sitting.   Pain relieved by:  moving around.     Home Living:     Prior Level of Function:  Prior Function Per Pt/Caregiver Report  Level of [place]: Independent with ADLs and functional transfers  Patient previously independent with all ADLs  Exercise/Physical Activity: yes  Patient's Goal for Treatment:  relieving pain, reducing symptoms.     Precautions/Fall Risk:fall risk low Pacemaker no  Seizures No  Post Op Movement/Restrictions No    Vital Signs:       Objective   OBSERVATION: increased T spine kyphosis, rounded shoulder posture, even pelvis in standing.      PALPATION: WNL today.     SENSATION: WNL    Lumbar ROM: *Pain no increased pain today  Flexion 50%  Extension 50%  SB 50% B  Rotation R75% B with slight discomfort              Hip screening: cleared today.      STRENGTH:  Upper abdominals   Lower abdominals able to hold supine 90-90 10sec with slight doming noted.    Able to supine bridge single leg with slight loss of pelvic clearance.      MYOTOMES: WNL B LE.     FUNCTIONAL STRENGTH:  Balance  Gait amb on level with no significant deviations.      SPECIAL TEST:  Pelvis alignment: even   SLR neg  cross SLR neg  joint play: T spine decreased PA glide, no increased pain.     B UE WNL no increased pain today.     Oswestry 6%     Outcome Measures:  Other Measures  Oswestry Disablity Index (SEKOU): 3     Treatments:     Treatment Performed Today:   Treatment provided today:   Educated patient on evaluation findings and POC, expectations and course of PT, questions addressed.     Response to Treatment: improved knowledge and understanding of condition    Education/Resources provided today: Home Program   Kwicr:  Access Code: [email or web address]  URL: [email or web address]  Date: 09/04/2024  Prepared by: Radha Wong    Exercises  - Sidelying Thoracic Rotation with Open Book  - 1 x daily - 5 x weekly - 1 sets - 15 reps  - Supine Lower Trunk Rotation  - 1 x daily - 5 x weekly - 1 sets - 15 reps  - Open Book Chest Stretch on Towel Roll  - 1 x daily - 5 x weekly - 1 sets - 15 reps  - Supine Bridge  - 1 x daily - 3 x weekly - 2 sets - 15 reps  - Standing Shoulder Abduction Full Range  - 1 x daily - 5 x weekly - 1 sets - 15 reps  - Standing Scapular Retraction in Abduction  - 1 x daily - 5 x weekly - 1 sets - 15 reps  - Shoulder External Rotation and Scapular Retraction with Resistance  - 1 x daily - 3 x weekly - 2 sets - 15 reps  - Shoulder extension with resistance - Neutral  - 1 x daily - 3 x weekly - 2 sets - 15 reps    Activity:       OP EDUCATION:  Outpatient Education  Individual(s) Educated: Patient  Education Provided: Home Exercise Program, POC  Patient/Caregiver Demonstrated Understanding: yes  Plan of Care Discussed and  Agreed Upon: yes  Patient Response to Education: Patient/Caregiver Verbalized Understanding of Information    Goals:  To be met at time of discharge:  -- Decrease pain to _,3/10 at worst__.  -- Independent with HEP.  -- Functional outcome:  Tolerating supine posture stretching and core stab program x30min in clinic with no increased pain.  Able to demonstrate awareness of proper posture and scapular strengthening with no increased pain.  No pain flare up >3/10 pain over the next 2-3 months.

## 2024-09-19 DIAGNOSIS — Z00.00 HEALTHCARE MAINTENANCE: ICD-10-CM

## 2024-09-21 RX ORDER — TAMSULOSIN HYDROCHLORIDE 0.4 MG/1
0.8 CAPSULE ORAL DAILY
Qty: 90 CAPSULE | Refills: 3 | Status: SHIPPED | OUTPATIENT
Start: 2024-09-21

## 2024-09-26 ENCOUNTER — APPOINTMENT (OUTPATIENT)
Dept: ENDOCRINOLOGY | Facility: CLINIC | Age: 79
End: 2024-09-26
Payer: COMMERCIAL

## 2024-09-26 VITALS
BODY MASS INDEX: 27.28 KG/M2 | RESPIRATION RATE: 16 BRPM | SYSTOLIC BLOOD PRESSURE: 112 MMHG | HEART RATE: 91 BPM | WEIGHT: 159.8 LBS | HEIGHT: 64 IN | DIASTOLIC BLOOD PRESSURE: 70 MMHG

## 2024-09-26 DIAGNOSIS — E11.9 TYPE 2 DIABETES MELLITUS WITHOUT COMPLICATION, WITHOUT LONG-TERM CURRENT USE OF INSULIN (MULTI): Primary | ICD-10-CM

## 2024-09-26 DIAGNOSIS — I10 HYPERTENSION, UNSPECIFIED TYPE: ICD-10-CM

## 2024-09-26 DIAGNOSIS — E78.5 HYPERLIPIDEMIA, UNSPECIFIED HYPERLIPIDEMIA TYPE: ICD-10-CM

## 2024-09-26 PROCEDURE — 3078F DIAST BP <80 MM HG: CPT | Performed by: INTERNAL MEDICINE

## 2024-09-26 PROCEDURE — 3074F SYST BP LT 130 MM HG: CPT | Performed by: INTERNAL MEDICINE

## 2024-09-26 PROCEDURE — 99214 OFFICE O/P EST MOD 30 MIN: CPT | Performed by: INTERNAL MEDICINE

## 2024-09-26 PROCEDURE — 1159F MED LIST DOCD IN RCRD: CPT | Performed by: INTERNAL MEDICINE

## 2024-09-26 PROCEDURE — 1160F RVW MEDS BY RX/DR IN RCRD: CPT | Performed by: INTERNAL MEDICINE

## 2024-09-26 PROCEDURE — 1036F TOBACCO NON-USER: CPT | Performed by: INTERNAL MEDICINE

## 2024-09-26 ASSESSMENT — ENCOUNTER SYMPTOMS
CHILLS: 0
DIZZINESS: 0
NAUSEA: 0
DIARRHEA: 0
FEVER: 0
VOMITING: 0
LIGHT-HEADEDNESS: 0
SHORTNESS OF BREATH: 0

## 2024-09-26 NOTE — PROGRESS NOTES
Endocrinology: Follow up visit  Subjective   Patient ID: Farooq Hendrix is a 79 y.o. male who presents for Diabetes (Type 2), Hyperlipidemia, and Hypertension.    PCP: Bran Shin MD    HPI  Dm2:  Ozempic+ metformin  Since last visit doing great.  A1c excellent.   No issues with tolerance of medication  Feeling well.  Submitting what could be last marin proposal today, 5 yrs potential.     Review of Systems   Constitutional:  Negative for chills and fever.   Respiratory:  Negative for shortness of breath.    Gastrointestinal:  Negative for diarrhea, nausea and vomiting.   Endocrine: Negative for cold intolerance and heat intolerance.   Neurological:  Negative for dizziness and light-headedness.       Patient Active Problem List   Diagnosis    Benign prostate hyperplasia    Depression with anxiety    Type 2 diabetes mellitus with diabetic polyneuropathy, without long-term current use of insulin (Multi)    Other male erectile dysfunction    Hyperlipidemia    Hypertension    Obstructive sleep apnea syndrome, severe    Recurrent major depression-severe (Multi)    Controlled type 2 diabetes mellitus without complication, without long-term current use of insulin (Multi)    Mood disorder (CMS-HCC)    Malignant neoplasm (Multi)    Basal cell carcinoma    Chronic bilateral low back pain without sciatica    Mid back pain        Home Meds:  Current Outpatient Medications   Medication Instructions    atorvastatin (LIPITOR) 10 mg, oral, Daily    ciclopirox (Loprox) 0.77 % cream APPLY TO AFFECTED AREA IN GROIN TWICE A DAY    docusate sodium (COLACE) 100 mg, oral, 2 times daily    fluoride, sodium, (Prevident 5000 Plus) 1.1 % dental cream PLEASE SEE ATTACHED FOR DETAILED DIRECTIONS    fluticasone (Flonase) 50 mcg/actuation nasal spray 2 sprays, Each Nostril, Daily    lisinopril 10 mg, oral, Daily    metFORMIN (GLUCOPHAGE) 1,000 mg, oral, 2 times daily    NuvigiL 50 mg, oral, 2 times daily    OLANZapine (ZYPREXA) 2.5 mg,  "oral, Daily    Ozempic 0.5 mg, subcutaneous, Once Weekly    sildenafil (VIAGRA) 100 mg, oral, Daily RT    tamsulosin (FLOMAX) 0.8 mg, oral, Daily    traZODone (Desyrel) 50 mg tablet TAKE 1 TO 2 TABLETS BY MOUTH AT BEDTIME    triamcinolone (Kenalog) 0.1 % cream APPLY SPARINGLY TO AFFECTED AREA(S) 2 TO 3 TIMES DAILY.    venlafaxine 150 mg, oral, Daily        Allergies   Allergen Reactions    Animal Dander Unknown        Objective   Vitals:    09/26/24 0827   BP: 112/70   Pulse: 91   Resp: 16      Vitals:    09/26/24 0827   Weight: 72.5 kg (159 lb 12.8 oz)      Body mass index is 27.43 kg/m².   Physical Exam  Constitutional:       Appearance: Normal appearance. He is overweight.   HENT:      Head: Normocephalic and atraumatic.   Neck:      Thyroid: No thyroid mass, thyromegaly or thyroid tenderness.   Cardiovascular:      Rate and Rhythm: Normal rate and regular rhythm.      Heart sounds: No murmur heard.     No gallop.   Pulmonary:      Effort: Pulmonary effort is normal.      Breath sounds: Normal breath sounds.   Abdominal:      Palpations: Abdomen is soft.      Comments: benign   Neurological:      General: No focal deficit present.      Mental Status: He is alert and oriented to person, place, and time.      Deep Tendon Reflexes: Reflexes are normal and symmetric.   Psychiatric:         Behavior: Behavior is cooperative.         Labs:  Lab Results   Component Value Date    HGBA1C 6.3 07/08/2024    TSH 1.09 08/02/2023    FREET4 1.14 06/11/2019      No results found for: \"PR1\", \"THYROIDPAB\", \"TSI\"     Assessment/Plan   Assessment & Plan  Type 2 diabetes mellitus without complication, without long-term current use of insulin (Multi)    Sugars are great, no issues with medication    Hyperlipidemia, unspecified hyperlipidemia type    Lipids excellent on statin  Hypertension, unspecified type    Bp excellent      Electronically signed by:  Felicitas German MD 09/26/24 9:00 AM              "

## 2024-10-03 DIAGNOSIS — E11.9 TYPE 2 DIABETES MELLITUS WITHOUT COMPLICATION, WITHOUT LONG-TERM CURRENT USE OF INSULIN (MULTI): Primary | ICD-10-CM

## 2024-10-03 RX ORDER — METFORMIN HYDROCHLORIDE 1000 MG/1
1000 TABLET ORAL 2 TIMES DAILY
Qty: 180 TABLET | Refills: 3 | Status: SHIPPED | OUTPATIENT
Start: 2024-10-03 | End: 2025-10-03

## 2024-10-04 ENCOUNTER — APPOINTMENT (OUTPATIENT)
Dept: PHYSICAL THERAPY | Facility: HOSPITAL | Age: 79
End: 2024-10-04
Payer: COMMERCIAL

## 2024-10-08 ENCOUNTER — APPOINTMENT (OUTPATIENT)
Dept: PHYSICAL THERAPY | Facility: HOSPITAL | Age: 79
End: 2024-10-08
Payer: COMMERCIAL

## 2024-10-15 ENCOUNTER — TREATMENT (OUTPATIENT)
Dept: PHYSICAL THERAPY | Facility: HOSPITAL | Age: 79
End: 2024-10-15
Payer: COMMERCIAL

## 2024-10-15 DIAGNOSIS — G89.29 CHRONIC BILATERAL LOW BACK PAIN WITHOUT SCIATICA: ICD-10-CM

## 2024-10-15 DIAGNOSIS — M54.50 CHRONIC BILATERAL LOW BACK PAIN WITHOUT SCIATICA: ICD-10-CM

## 2024-10-15 DIAGNOSIS — M54.9 MID BACK PAIN: ICD-10-CM

## 2024-10-15 PROCEDURE — 97110 THERAPEUTIC EXERCISES: CPT | Mod: GP | Performed by: PHYSICAL THERAPIST

## 2024-10-15 NOTE — PROGRESS NOTES
Physical Therapy Treatment    Patient Name:Farooq Hendrix   MRN:[id number]   Today's Date:10/15/2024   Referred by: Bran Shin           PT Therapeutic Procedures Time Entry  Manual Therapy Time Entry: 5  Therapeutic Exercise Time Entry: 35       Insurance  Visit number: 2   Approved number of visits: 30  Auth required: No  Authorization date range: NA  Onset Date: 7/8/2024    Payor: [employer] [facility] / Plan: [employer] / Product Type: *No Product type* /     Assessment:  Overall doing well, worked on posture and scapular strengthening, tolerated well, no pain currently, reviewed with pt to continue with effort of posture correction with goal of min to no flare up. Pt verbalized understanding.      Plan:  Recheck in 2 weeks, continue POC to work on posture stretching and trunk rotation stretching, scapular and back strengthening, manual prn, core stab program for long term sx management, pt to call if any questions.     Precautions/Fall Risk:  Fall Risk: Low based on professional judgment  Pacemaker: no    Seizures: No    Post Op Movement/Restrictions: No:  Weight Bearing Status: As tolerated  Other Medical precautions: DM, HTN, sleep apnea      OP PT Plan  PT Plan: Skilled PT  PT Frequency:  (1x/2 weeks)  Duration: 10 sessions  Onset Date: 07/08/24  Number of Treatments Authorized: MMO 30V  Rehab Potential: Good  Plan of Care Agreement: Patient    Therapy Diagnosis   Problem List Items Addressed This Visit             ICD-10-CM       Musculoskeletal and Injuries    Chronic bilateral low back pain without sciatica M54.50, G89.29    Mid back pain M54.9       Current Problem  1. Chronic bilateral low back pain without sciatica  Follow Up In Physical Therapy      2. Mid back pain  Follow Up In Physical Therapy              Subjective/Pain:  Pt states he has been doing well, not much pain lately, not able to do his HEP consistently due to marin deadlines.     Current Pain Level (0-10):  0    HEP Compliance: Fair    General:  General  Reason for Referral: chronic LBP  Referred By: Bran Shin MD  Past Medical History Relevant to Rehab: HTN, DM  Preferred Learning Style: verbal, visual, written  Precautions:  Precautions  Precautions Comment: HTN, DM    Objective/Outcome Measures:      Outcome Measures:       Treatments:     PT Therapeutic Procedures Time Entry  Manual Therapy Time Entry: 5  Therapeutic Exercise Time Entry: 35 minutes    Therapeutic exercises:   UBE backwards only L1 x6min  Supine on half foam, posture stretching 1min each:  Static stretch  Horizontal abd  Supine punch  Shoulder flex/ext  Sidelying open book stretch x15 R/L  Supine bridging x10  LTR reviewed  Rowing 2 plates x15  Lats pulldown x15, 20#cable  Back against wall, 2# arm [place] CW x30sec/CCW x30sec  Back against wall, 2# shoulder press x15  Wall push up x15    Manual:  Supine manual chest stretching x5min for scapular retraction.        Goals:  To be met at time of discharge:  -- Decrease pain to _,3/10 at worst__.  -- Independent with HEP.  -- Functional outcome:  Tolerating supine posture stretching and core stab program x30min in clinic with no increased pain.  Able to demonstrate awareness of proper posture and scapular strengthening with no increased pain.  No pain flare up >3/10 pain over the next 2-3 months.

## 2024-10-25 DIAGNOSIS — E11.9 TYPE 2 DIABETES MELLITUS WITHOUT COMPLICATION, WITHOUT LONG-TERM CURRENT USE OF INSULIN (MULTI): ICD-10-CM

## 2024-10-25 RX ORDER — METFORMIN HYDROCHLORIDE 1000 MG/1
1000 TABLET ORAL 2 TIMES DAILY
Qty: 180 TABLET | Refills: 3 | Status: SHIPPED | OUTPATIENT
Start: 2024-10-25 | End: 2025-10-25

## 2024-10-28 ENCOUNTER — APPOINTMENT (OUTPATIENT)
Dept: PHYSICAL THERAPY | Facility: HOSPITAL | Age: 79
End: 2024-10-28
Payer: COMMERCIAL

## 2024-11-07 ENCOUNTER — APPOINTMENT (OUTPATIENT)
Dept: OTOLARYNGOLOGY | Facility: CLINIC | Age: 79
End: 2024-11-07
Payer: COMMERCIAL

## 2024-11-11 ENCOUNTER — APPOINTMENT (OUTPATIENT)
Dept: PHYSICAL THERAPY | Facility: HOSPITAL | Age: 79
End: 2024-11-11
Payer: COMMERCIAL

## 2024-11-12 ENCOUNTER — APPOINTMENT (OUTPATIENT)
Dept: BEHAVIORAL HEALTH | Facility: CLINIC | Age: 79
End: 2024-11-12
Payer: COMMERCIAL

## 2024-11-14 ENCOUNTER — APPOINTMENT (OUTPATIENT)
Dept: OTOLARYNGOLOGY | Facility: CLINIC | Age: 79
End: 2024-11-14
Payer: COMMERCIAL

## 2024-11-14 VITALS — BODY MASS INDEX: 27.31 KG/M2 | WEIGHT: 160 LBS | HEIGHT: 64 IN

## 2024-11-14 DIAGNOSIS — H61.23 BILATERAL IMPACTED CERUMEN: Primary | ICD-10-CM

## 2024-11-14 PROCEDURE — 69210 REMOVE IMPACTED EAR WAX UNI: CPT | Performed by: NURSE PRACTITIONER

## 2024-11-14 PROCEDURE — 1036F TOBACCO NON-USER: CPT | Performed by: NURSE PRACTITIONER

## 2024-11-14 PROCEDURE — 1159F MED LIST DOCD IN RCRD: CPT | Performed by: NURSE PRACTITIONER

## 2024-11-14 NOTE — PROGRESS NOTES
Subjective   Patient ID: Farooq Hendrix is a 79 y.o. male who presents for Cerumen Impaction.  HPI patient is here for routine ear cleaning follow-up.   Patient has a known bilateral sensorineural hearing loss and wears bilateral hearing aids.  He reports that he gains benefits with the hearing aids.  In the interim there is no ear infection.  He denies having any drainage or pain today.    Review of Systems      All other systems have been reviewed and are negative for complaints except for those mentioned in history of present illness, past medical history and problem list       Objective   Physical Exam  CONSTITUTIONAL: No acute distress, normal facial features; No fever; no chills  VOICE: No hoarseness or other audible abnormality  RESPIRATION: Breathing comfortably, no stridor; normal breathing effort  CV: No cyanosis visible on the face and neck area  EYES:Pupils equal and round ; no erythema; conjunctiva clear; sclera white  NEURO: Alert and oriented, able to raise eyebrows symmetrical bilateral, smile with no facial droop, able to swallow  HEAD AND FACE: Symmetric facial features, no masses or lesions    Right ear examination: External ear normal.  EAC with impacted cerumen.  TM not visualized.  Left ear examination: External ear normal.  EAC with impacted cerumen.  TM not visualized.    NOSE: External nose midline  ORAL CAVITY: No lesions of external lips  NECK/LYMPH: No obvious deformity or lesions; trachea is midline  PSYCH: Alert and oriented with appropriate mood and affect       Patient ID: Farooq Hendrix is a 79 y.o. male.    Procedures  Cerumen removal    Consent:  The planned procedure is discussed including possible risk, benefits and alternative treatments reviewed.  Verbal consent is obtained.    Indications:Obstructed cerumen is noted affecting hearing and causing discomfort.    Procedure: The ears are examined microscopically.  Using speculum, alligator, #7, suction the obstructive  cerumen in both the ears were removed.    Findings: Cerumen and epithelial debris obstruction in both external auditory canals.  Inspection of tympanic membrane after cleaning showed intact with no effusion, retraction or perforation.    Post procedure: The patient tolerated the procedure well without complications     Assessment/Plan     1. Bilateral impacted cerumen             This patient presents for evaluation of cerumen impaction. The ear/s cleaned using microscope and instruments.  Patient tolerated the procedure well. Inspection of TM after cleaning showed intact and WNL.  Patient was provided instructions on ear care for cerumen in the discussion summary. Patient may follow up as needed for repeat cleaning or for all other ENT concerns.  All questions were answered to patient's satisfaction.

## 2024-11-15 ENCOUNTER — APPOINTMENT (OUTPATIENT)
Dept: BEHAVIORAL HEALTH | Facility: CLINIC | Age: 79
End: 2024-11-15
Payer: COMMERCIAL

## 2024-11-15 DIAGNOSIS — F41.8 DEPRESSION WITH ANXIETY: ICD-10-CM

## 2024-11-15 DIAGNOSIS — F33.41 RECURRENT MAJOR DEPRESSIVE DISORDER, IN PARTIAL REMISSION (CMS-HCC): ICD-10-CM

## 2024-11-15 DIAGNOSIS — F41.8 OTHER SPECIFIED ANXIETY DISORDERS: ICD-10-CM

## 2024-11-15 PROCEDURE — 99213 OFFICE O/P EST LOW 20 MIN: CPT | Performed by: PSYCHIATRY & NEUROLOGY

## 2024-11-15 RX ORDER — OLANZAPINE 2.5 MG/1
2.5 TABLET ORAL DAILY
Qty: 90 TABLET | Refills: 1 | Status: SHIPPED | OUTPATIENT
Start: 2024-11-15

## 2024-11-15 NOTE — PATIENT INSTRUCTIONS
Plan:       - Continue current medications:   Venlafaxine ER 150mg once daily  Olanzapine 2.5mg at bedtime  Armodafinil 100mg daily  Trazodone 50mg at bedtime  - Continue Vit D and Vit B12 supplementation.   - Continue psychotherapy.   - Continue using CPAP for sleep apnea.  - Follow up with me in 3 months.   - Contact via [employer] or call sooner ([phone]) in case of any questions or concerns.  - Call [phone] for mental health crisis or suicidal thoughts, or call 911 or go to the nearest emergency room for emergencies.

## 2024-11-15 NOTE — PROGRESS NOTES
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 Seen virtually today for follow up visit.     Virtual or Telephone Consent    An interactive audio and video telecommunication system which permits real time communications between the patient (at the originating site) and provider (at the distant site) was utilized to provide this telehealth service.   Verbal consent was requested and obtained from Farooq Hendrix on this date, 11/15/24 for a telehealth visit.     Subjective:   He says he is \"pretty good.\"     He says that his wife has talked him into selling their house and moving into a condominium.   He says he is trying very hard not to pay attention to the political situation.     Denies anhedonia.   Appetite - suppressed on Ozempic; he weighs between 150 to 153 lbs.   He says he continues to work with an \"elder\"  and it has been going well. When he moves, he will have to take stairs more.   He says he is sleeping okay. He is taking trazodone 50mg at bedtime. He has been using CPAP machine but notes that when he wakes up, he has removed it.   He has been more motivated lately. He says that he has made resolutions for quarters and individual months. He says he cleaned his office in the first month.   Denies any death wishes or suicidal thoughts, intent or plans.     Reports longstanding anxiety; has been concerned about the political situation.   He says that he has had trouble making decisions all his life.   No panic attacks.     OCD - Used to have OCD in the past. No significant compulsions and obsessions recently.     Denies any manic or hypomanic episodes.      Denies AVH, paranoia or delusions.     He talks to someone weekly from [place] for psychotherapy (Florencia Grant).    He says he has had some GI issues (loose stools) for about a week; he thought it might " "be from something he ate.       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     His son is in town (lives in [place]); they are going to visit soon.     Current medications:   Venlafaxine ER 150mg once daily  Olanzapine 2.5mg at bedtime. He says he stopped olanzapine once after a psychologist advised to do so and his mood became really bad.   Trazodone 50mg one tablet at bedtime; decreased from 100mg.   Armodafinil 50mg two tablets every morning - helps with motivation. He says he tried to lower Armodafinil but felt the symptoms got worse, so he went back to 100mg/day.      Past medications:   Fluoxetine - on it for a while.   Amitriptyline - had visual side effects.     Psychiatric Review Of Systems:   As above.       Medical Review Of Systems:    Pertinent items are noted in HPI.    Record Review: brief     Mental Status Evaluation:  Appearance: Fair grooming.   Behavior/Attitude: Cooperative. Pleasant.   Motor: Psychomotor activity in average range. No abnormal involuntary movements.  Speech: Regular in rate, tone and volume. No pressure.  Mood: \"pretty good.\"  Affect: Congruent to stated mood. Mobilized appropriately. Normal range.   Thought process: Goal-directed. Linear. Organized.  Thought content: No paranoia, delusion or ideas of reference elicited. No hallucinations in auditory, visual or other sensory modalities.   Suicidal ideation: denied.  Homicidal ideation: denied.   Insight: Fair.  Judgment: Fair.  Recent and remote memory: intact based on recall of recent and remote autobiographical memories.  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 Seen virtually for follow up visit.      Initial impression:   8/1/2023 - He has a long history of depression and anxiety which " seem fairly well-controlled on current medication regimen. He also had OCD symptoms in the past but has not had significant symptoms recently.     Labs:   8/2/23 - Low Vit D; low normal Vit B12; normal folate, TSH. Has been taking Vit D and Vit B12 supplements.     11/15/24 - Stable mood. Anxiety is manageable. He is planning to move from his house to a condominium to downsize.      Diagnosis:   Major depressive disorder, recurrent, in partial remission  Other specified anxiety disorder  H/O OCD.     Treatment Plan/Recommendations:   - Continue current medications:   Venlafaxine ER 150mg once daily  Olanzapine 2.5mg at bedtime  Armodafinil 100mg daily  Trazodone 50mg at bedtime  - Continue Vit D and Vit B12 supplementation.   - Continue psychotherapy.   - Provided supportive therapy.   - Continue using CPAP for sleep apnea.  - Follow up with me in 3 months.   - Contact sooner if needed.       Review with patient: Treatment plan reviewed with the patient.    Mohini Tello MD.

## 2024-12-05 DIAGNOSIS — F41.8 DEPRESSION WITH ANXIETY: ICD-10-CM

## 2024-12-05 RX ORDER — ARMODAFINIL 50 MG/1
50 TABLET ORAL 2 TIMES DAILY
Qty: 180 TABLET | Refills: 0 | Status: SHIPPED | OUTPATIENT
Start: 2024-12-05

## 2024-12-20 DIAGNOSIS — E78.5 HYPERLIPIDEMIA, UNSPECIFIED HYPERLIPIDEMIA TYPE: ICD-10-CM

## 2024-12-23 RX ORDER — ATORVASTATIN CALCIUM 10 MG/1
10 TABLET, FILM COATED ORAL DAILY
Qty: 90 TABLET | Refills: 3 | Status: SHIPPED | OUTPATIENT
Start: 2024-12-23

## 2025-01-06 DIAGNOSIS — F41.8 DEPRESSION WITH ANXIETY: ICD-10-CM

## 2025-01-06 RX ORDER — VENLAFAXINE HYDROCHLORIDE 150 MG/1
150 TABLET, EXTENDED RELEASE ORAL DAILY
Qty: 90 TABLET | Refills: 1 | Status: SHIPPED | OUTPATIENT
Start: 2025-01-06

## 2025-02-04 ENCOUNTER — APPOINTMENT (OUTPATIENT)
Dept: BEHAVIORAL HEALTH | Facility: CLINIC | Age: 80
End: 2025-02-04
Payer: COMMERCIAL

## 2025-02-04 VITALS
TEMPERATURE: 97 F | DIASTOLIC BLOOD PRESSURE: 69 MMHG | WEIGHT: 164 LBS | RESPIRATION RATE: 16 BRPM | SYSTOLIC BLOOD PRESSURE: 106 MMHG | HEART RATE: 65 BPM | BODY MASS INDEX: 28.15 KG/M2

## 2025-02-04 DIAGNOSIS — F33.41 RECURRENT MAJOR DEPRESSIVE DISORDER, IN PARTIAL REMISSION (CMS-HCC): ICD-10-CM

## 2025-02-04 DIAGNOSIS — F41.8 DEPRESSION WITH ANXIETY: ICD-10-CM

## 2025-02-04 DIAGNOSIS — F41.8 OTHER SPECIFIED ANXIETY DISORDERS: ICD-10-CM

## 2025-02-04 PROCEDURE — 1036F TOBACCO NON-USER: CPT | Performed by: PSYCHIATRY & NEUROLOGY

## 2025-02-04 PROCEDURE — 99213 OFFICE O/P EST LOW 20 MIN: CPT | Performed by: PSYCHIATRY & NEUROLOGY

## 2025-02-04 PROCEDURE — 1126F AMNT PAIN NOTED NONE PRSNT: CPT | Performed by: PSYCHIATRY & NEUROLOGY

## 2025-02-04 PROCEDURE — 3074F SYST BP LT 130 MM HG: CPT | Performed by: PSYCHIATRY & NEUROLOGY

## 2025-02-04 PROCEDURE — 1159F MED LIST DOCD IN RCRD: CPT | Performed by: PSYCHIATRY & NEUROLOGY

## 2025-02-04 PROCEDURE — 1160F RVW MEDS BY RX/DR IN RCRD: CPT | Performed by: PSYCHIATRY & NEUROLOGY

## 2025-02-04 PROCEDURE — 3078F DIAST BP <80 MM HG: CPT | Performed by: PSYCHIATRY & NEUROLOGY

## 2025-02-04 RX ORDER — ARMODAFINIL 50 MG/1
50 TABLET ORAL 2 TIMES DAILY
Qty: 180 TABLET | Refills: 0 | Status: SHIPPED | OUTPATIENT
Start: 2025-02-04

## 2025-02-04 RX ORDER — TRAZODONE HYDROCHLORIDE 50 MG/1
50 TABLET ORAL NIGHTLY
Qty: 90 TABLET | Refills: 1 | Status: SHIPPED | OUTPATIENT
Start: 2025-02-04

## 2025-02-04 ASSESSMENT — COLUMBIA-SUICIDE SEVERITY RATING SCALE - C-SSRS
1. HAVE YOU WISHED YOU WERE DEAD OR WISHED YOU COULD GO TO SLEEP AND NOT WAKE UP?: NO
2. HAVE YOU ACTUALLY HAD ANY THOUGHTS OF KILLING YOURSELF?: NO
2. HAVE YOU ACTUALLY HAD ANY THOUGHTS OF KILLING YOURSELF?: NO
1. SINCE LAST CONTACT, HAVE YOU WISHED YOU WERE DEAD OR WISHED YOU COULD GO TO SLEEP AND NOT WAKE UP?: NO

## 2025-02-04 ASSESSMENT — PAIN SCALES - GENERAL: PAINLEVEL_OUTOF10: 0-NO PAIN

## 2025-02-04 NOTE — PATIENT INSTRUCTIONS
Plan:   - Continue to monitor for any significant changes in mood. Contact me if needed and we can consider medication change options.   - Continue current medications:   Venlafaxine ER 150mg once daily  Olanzapine 2.5mg at bedtime  Armodafinil 100mg daily  Trazodone 50mg at bedtime  - Continue Vit D and Vit B12 supplementation.   - Consider using a lightbox (with 10,000 LUX) during winter months.   - Continue psychotherapy.   - Recommend using CPAP for sleep apnea.  - Follow up with me in 3 months.   - Contact via [employer] or call sooner ([phone]) in case of any questions or concerns.  - Call [phone] for mental health crisis or suicidal thoughts, or call 911 or go to the nearest emergency room for emergencies.

## 2025-02-04 NOTE — PROGRESS NOTES
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 Seen in office today for follow up visit.     Subjective:     He says he is \"a little stressed.\"     He says they are getting ready to move to a condo, which is a \"smaller space.\" They are getting rid of things. He says it is difficult to decide which books to get rid of.   He says his research marin proposal is going to be reviewed later this month. This is very stressful for him.     He says he feels \"a little bit depressed but it is not extreme.\"   Denies anhedonia.   Appetite - suppressed on Ozempic. Weight has been stable.   He says he is sleeping more. He is taking trazodone 50mg at bedtime. He has not been using CPAP machine. He has occasionally taken a nap after work.    He has been working with a  once a week to exercise.   Denies any death wishes or suicidal thoughts, intent or plans.     He says that he does not seem to have sexual desire. He says he has not had an erection in a while. He has taken Viagra but it does not seem to help.     Reports longstanding anxiety. He says that he has had trouble making decisions all his life.   No panic attacks.     OCD - Used to have OCD in the past. No significant compulsions and obsessions recently.     Denies any manic or hypomanic episodes.      Denies AVH, paranoia or delusions.     He says he tried a lightbox a long time ago but it did not seem to help.     He talks to someone weekly from [place] on [employer] for psychotherapy (Florencia Grant).    He is going to [place] in May for a meeting and will be seeing a friend there.       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     His son is in town (lives in [place]); they are going to visit soon.     Current medications:   Venlafaxine ER 150mg once daily  Olanzapine 2.5mg at bedtime. (He says he stopped olanzapine once after a psychologist advised to " "do so and his mood became really bad.)  Trazodone 50mg one tablet at bedtime; decreased from 100mg.   Armodafinil 50mg two tablets every morning - helps with motivation. He says he tried to lower Armodafinil but felt the symptoms got worse, so he went back to 100mg/day.      Past medications:   Fluoxetine - on it for a while.   Amitriptyline - had visual side effects.     Psychiatric Review Of Systems:   As above.       Medical Review Of Systems:    Pertinent items are noted in HPI.    Record Review: brief     Mental Status Evaluation:  Appearance: Fair grooming.   Behavior/Attitude: Cooperative. Pleasant.   Motor: Psychomotor activity in average range. No abnormal involuntary movements.  Speech: Regular in rate, tone and volume. No pressure.  Mood: \"okay.\"  Affect: Congruent to stated mood. Mobilized appropriately. Normal range.   Thought process: Goal-directed. Linear. Organized.  Thought content: No paranoia, delusion or ideas of reference elicited. No hallucinations in auditory, visual or other sensory modalities.   Suicidal ideation: denied.  Homicidal ideation: denied.   Insight: Fair.  Judgment: Fair.  Recent and remote memory: intact based on recall of recent and remote autobiographical memories.  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 Seen in office for follow up visit today.      Initial impression:   8/1/2023 - He has a long history of depression and anxiety which seem fairly well-controlled on current medication regimen. He also had OCD symptoms in the past but has not had significant symptoms recently.     2/4/25 - Stable mood but has increased stress (upcoming move, marin being reviewed). Anxiety is manageable.      Diagnosis:   Major depressive " disorder, recurrent, in partial remission  Other specified anxiety disorder  H/O OCD.     Treatment Plan/Recommendations:   - Discussed need to monitor mood carefully. If there is worsening, then we can consider medication change.   - Continue current medications:   Venlafaxine ER 150mg once daily  Olanzapine 2.5mg at bedtime  Armodafinil 100mg daily  Trazodone 50mg at bedtime  - Continue Vit D and Vit B12 supplementation.   - Continue psychotherapy.   - Provided supportive therapy.   - Recommended using CPAP for sleep apnea.  - Follow up with me in 3 months.   - Contact sooner if needed.       Review with patient: Treatment plan reviewed with the patient.    Mohini Tello MD.

## 2025-02-14 DIAGNOSIS — J30.2 SEASONAL ALLERGIC RHINITIS, UNSPECIFIED TRIGGER: ICD-10-CM

## 2025-02-14 RX ORDER — FLUTICASONE PROPIONATE 50 MCG
2 SPRAY, SUSPENSION (ML) NASAL DAILY
Qty: 48 ML | Refills: 2 | Status: SHIPPED | OUTPATIENT
Start: 2025-02-14

## 2025-03-18 ENCOUNTER — OFFICE VISIT (OUTPATIENT)
Dept: PRIMARY CARE | Facility: CLINIC | Age: 80
End: 2025-03-18
Payer: COMMERCIAL

## 2025-03-18 VITALS
WEIGHT: 155 LBS | TEMPERATURE: 97.4 F | HEART RATE: 59 BPM | OXYGEN SATURATION: 97 % | HEIGHT: 64 IN | BODY MASS INDEX: 26.46 KG/M2 | SYSTOLIC BLOOD PRESSURE: 119 MMHG | DIASTOLIC BLOOD PRESSURE: 73 MMHG

## 2025-03-18 DIAGNOSIS — R19.4 CHANGE IN BOWEL HABITS: ICD-10-CM

## 2025-03-18 DIAGNOSIS — E11.42 TYPE 2 DIABETES MELLITUS WITH DIABETIC POLYNEUROPATHY, WITHOUT LONG-TERM CURRENT USE OF INSULIN: Primary | ICD-10-CM

## 2025-03-18 DIAGNOSIS — M79.675 PAIN OF TOE OF LEFT FOOT: ICD-10-CM

## 2025-03-18 PROCEDURE — 1036F TOBACCO NON-USER: CPT | Performed by: STUDENT IN AN ORGANIZED HEALTH CARE EDUCATION/TRAINING PROGRAM

## 2025-03-18 PROCEDURE — 3074F SYST BP LT 130 MM HG: CPT | Performed by: STUDENT IN AN ORGANIZED HEALTH CARE EDUCATION/TRAINING PROGRAM

## 2025-03-18 PROCEDURE — 3078F DIAST BP <80 MM HG: CPT | Performed by: STUDENT IN AN ORGANIZED HEALTH CARE EDUCATION/TRAINING PROGRAM

## 2025-03-18 PROCEDURE — 99214 OFFICE O/P EST MOD 30 MIN: CPT | Performed by: STUDENT IN AN ORGANIZED HEALTH CARE EDUCATION/TRAINING PROGRAM

## 2025-03-18 PROCEDURE — 1126F AMNT PAIN NOTED NONE PRSNT: CPT | Performed by: STUDENT IN AN ORGANIZED HEALTH CARE EDUCATION/TRAINING PROGRAM

## 2025-03-18 ASSESSMENT — PAIN SCALES - GENERAL: PAINLEVEL_OUTOF10: 0-NO PAIN

## 2025-03-18 NOTE — PROGRESS NOTES
"Subjective   Patient ID: Farooq Hendrix is a 79 y.o. male who presents for No chief complaint on file..  History of Present Illness  Farooq Hendrix is an 79 year old male with diabetes who presents with left middle toe pain and changes in stool characteristics.    He has been experiencing pain and inflammation in the middle toe of his left foot for at least a month. Initially, the pain was significant but has since decreased. He attributes the onset to a sharp toenail causing a sore, which resolved, but the toe remained red and appears slightly bent. No trauma, such as dropping or bumping the toe, and no signs of infection like pus or lesions are reported. Movement seems to alleviate the discomfort, but he has not tried any conservative treatments like icing or analgesics.    He reports a change in his bowel habits over the past few months. Previously, his stools would sink and he rarely needed toilet paper. Recently, his stools have started to float, and he requires more toilet paper. He denies any changes in diet and has used psyllium husk as a fiber supplement for a short period. His last colonoscopy in 2020 revealed a 2mm polyp, but otherwise, the colon was healthy.    He has diabetes but does not monitor his blood sugar at home, although he tolerates his diabetic medications well.    PMHx, FHx, Social Hx, Surg Hx personally reviewed at this appointment. No pertinent findings and/or changes from prior (if applicable).    ROS: Denies wt gain/loss f/c HA LoC CP SOB NVDC. See HPI above, and scanned sheet (if applicable). All other systems are reviewed and are without complaint.     Review of Systems    Objective     /73   Pulse 59   Temp 36.3 °C (97.4 °F)   Ht 1.626 m (5' 4\")   Wt 70.3 kg (155 lb)   SpO2 97%   BMI 26.61 kg/m²      Physical Exam  Gen: NAD. AAO x3.  HEENT: NC/AT. Anicteric sclera, symmetric pupils. MMM no thrush.  Neck: Soft, supple. No LAD. No goiter.  CV: RRR nl s1s2 no " m/r/g  Pulm: CTAB no w/r/r, good air exchange  GI: soft NTND BS+ no hsm  Ext: WWP no edema  Neuro: II-XII grossly intact, nonfocal systemic findings  MSK: 5/5 strength b/l UE and LE  Gait: unremarkable   Feet: no ulcers, nl monofilament testing   EXTREMITIES: Right foot with strong pulses and without ulcers. Left foot with mild swelling at middle toe and without signs of infection.    Assessment & Plan  Left middle toe pain  Left middle toe pain due to sharp toenail causing soreness. No infection, fracture, gout, ulcers, or neuropathy. Conservative management recommended.  - Recommend icing the toe.  - Consider shoe insert for support.  - no role for x-ray at this time    Change in stool characteristics  Change in stool characteristics with floating stools. No dietary changes. Last colonoscopy in 2020 showed a 2mm polyp, no further colonoscopies needed. Conservative management with fiber advised.  - Recommend psyllium husk or other fiber supplements.    Type 2 diabetes mellitus (DM2): A1c improving  DM2 well-controlled with A1c of 6.3%. No home blood sugar monitoring. Tolerates medications well. Continue current management.  - Continue current diabetic medications.  - Encourage regular home blood sugar monitoring.    Follow-up  Concerns about office communication and dissatisfaction with Dr. Mathews discussed. Advised to continue care with Dr. Mathews.  - Address communication concerns with the office.  - Encourage continued follow-up with Dr. Mathews.    Bran Shin MD     This medical note was created with the assistance of artificial intelligence (AI) for documentation purposes. The content has been reviewed and confirmed by the healthcare provider for accuracy and completeness. Patient consented to the use of audio recording and use of AI during their visit.

## 2025-03-18 NOTE — PATIENT INSTRUCTIONS
Your blood work is up to date; no need for additional draw at this appointment    I recommend conservative therapy for your toe. I do not believe x-rays are indicated at this time.    I recommend fiber supplementation for your loose stools.     Follow up with your specialists as previously scheduled.

## 2025-03-19 ENCOUNTER — APPOINTMENT (OUTPATIENT)
Dept: PRIMARY CARE | Facility: CLINIC | Age: 80
End: 2025-03-19
Payer: COMMERCIAL

## 2025-03-20 ENCOUNTER — TELEPHONE (OUTPATIENT)
Dept: ENDOCRINOLOGY | Facility: CLINIC | Age: 80
End: 2025-03-20
Payer: COMMERCIAL

## 2025-03-20 NOTE — TELEPHONE ENCOUNTER
Pt called in states that he had his a1c done at pcp and it was 6.3%. pt would like to know if he needs to keep his appointment for 03/27/25?

## 2025-03-27 ENCOUNTER — APPOINTMENT (OUTPATIENT)
Dept: ENDOCRINOLOGY | Facility: CLINIC | Age: 80
End: 2025-03-27
Payer: COMMERCIAL

## 2025-03-27 VITALS
SYSTOLIC BLOOD PRESSURE: 110 MMHG | HEIGHT: 64 IN | BODY MASS INDEX: 26.91 KG/M2 | WEIGHT: 157.6 LBS | RESPIRATION RATE: 16 BRPM | DIASTOLIC BLOOD PRESSURE: 70 MMHG | HEART RATE: 76 BPM

## 2025-03-27 DIAGNOSIS — E11.9 TYPE 2 DIABETES MELLITUS WITHOUT COMPLICATION, WITHOUT LONG-TERM CURRENT USE OF INSULIN: Primary | ICD-10-CM

## 2025-03-27 DIAGNOSIS — I10 HYPERTENSION, UNSPECIFIED TYPE: ICD-10-CM

## 2025-03-27 DIAGNOSIS — E78.5 HYPERLIPIDEMIA, UNSPECIFIED HYPERLIPIDEMIA TYPE: ICD-10-CM

## 2025-03-27 PROCEDURE — 1160F RVW MEDS BY RX/DR IN RCRD: CPT | Performed by: INTERNAL MEDICINE

## 2025-03-27 PROCEDURE — 99214 OFFICE O/P EST MOD 30 MIN: CPT | Performed by: INTERNAL MEDICINE

## 2025-03-27 PROCEDURE — 1036F TOBACCO NON-USER: CPT | Performed by: INTERNAL MEDICINE

## 2025-03-27 PROCEDURE — 1159F MED LIST DOCD IN RCRD: CPT | Performed by: INTERNAL MEDICINE

## 2025-03-27 PROCEDURE — 3074F SYST BP LT 130 MM HG: CPT | Performed by: INTERNAL MEDICINE

## 2025-03-27 PROCEDURE — 3078F DIAST BP <80 MM HG: CPT | Performed by: INTERNAL MEDICINE

## 2025-03-27 ASSESSMENT — ENCOUNTER SYMPTOMS
VOMITING: 0
DIARRHEA: 0
LIGHT-HEADEDNESS: 0
SHORTNESS OF BREATH: 0
NAUSEA: 0
FEVER: 0
DIZZINESS: 0
CHILLS: 0

## 2025-03-27 NOTE — PROGRESS NOTES
Endocrinology: Follow up visit  Subjective   Patient ID: Farooq Hendrix is a 79 y.o. male who presents for Diabetes (Type 2 ), Hyperlipidemia, and Hypertension.    PCP: Bran Shin MD    HPI  Dm2:  Ozempic 0.5+ metformin 2000 mg    Since last visit still waiting to hear about last marin.   Unsure of funding given recent issues politically  Feeling well.   Recently moved and doing ok with it.    Sugars excellent as usual    Review of Systems   Constitutional:  Negative for chills and fever.   Respiratory:  Negative for shortness of breath.    Gastrointestinal:  Negative for diarrhea, nausea and vomiting.   Endocrine: Negative for cold intolerance and heat intolerance.   Neurological:  Negative for dizziness and light-headedness.       Patient Active Problem List   Diagnosis    Benign prostate hyperplasia    Depression with anxiety    Type 2 diabetes mellitus with diabetic polyneuropathy, without long-term current use of insulin    Other male erectile dysfunction    Hyperlipidemia    Hypertension    Obstructive sleep apnea syndrome, severe    Recurrent major depression-severe (Multi)    Controlled type 2 diabetes mellitus without complication, without long-term current use of insulin (Multi)    Mood disorder (CMS-HCC)    Malignant neoplasm (Multi)    Basal cell carcinoma    Chronic bilateral low back pain without sciatica    Mid back pain        Home Meds:  Current Outpatient Medications   Medication Instructions    armodafinil (NUVIGIL) 50 mg, oral, 2 times daily    ciclopirox (Loprox) 0.77 % cream APPLY TO AFFECTED AREA IN GROIN TWICE A DAY    docusate sodium (COLACE) 100 mg, 2 times daily    fluoride, sodium, (Prevident 5000 Plus) 1.1 % dental cream PLEASE SEE ATTACHED FOR DETAILED DIRECTIONS    fluticasone (Flonase) 50 mcg/actuation nasal spray 2 sprays, Each Nostril, Daily    Lipitor 10 mg, oral, Daily    lisinopril 10 mg, oral, Daily    metFORMIN (GLUCOPHAGE) 1,000 mg, oral, 2 times daily    OLANZapine  "(ZYPREXA) 2.5 mg, oral, Daily    Ozempic 0.5 mg, subcutaneous, Once Weekly    sildenafil (VIAGRA) 100 mg, oral, Daily RT    tamsulosin (FLOMAX) 0.8 mg, oral, Daily    traZODone (DESYREL) 50 mg, oral, Nightly, TAKE 1 TABLET BY MOUTH AT BEDTIME    triamcinolone (Kenalog) 0.1 % cream APPLY SPARINGLY TO AFFECTED AREA(S) 2 TO 3 TIMES DAILY.    venlafaxine 150 mg, oral, Daily        Allergies   Allergen Reactions    Animal Dander Unknown        Objective   Vitals:    03/27/25 0822   BP: 110/70   Pulse: 76   Resp: 16      Vitals:    03/27/25 0822   Weight: 71.5 kg (157 lb 9.6 oz)      Body mass index is 27.05 kg/m².   Physical Exam  Constitutional:       Appearance: Normal appearance. He is overweight.   HENT:      Head: Normocephalic and atraumatic.   Neck:      Thyroid: No thyroid mass, thyromegaly or thyroid tenderness.   Cardiovascular:      Rate and Rhythm: Normal rate and regular rhythm.      Heart sounds: No murmur heard.     No gallop.   Pulmonary:      Effort: Pulmonary effort is normal.      Breath sounds: Normal breath sounds.   Abdominal:      Palpations: Abdomen is soft.      Comments: benign   Neurological:      General: No focal deficit present.      Mental Status: He is alert and oriented to person, place, and time.      Deep Tendon Reflexes: Reflexes are normal and symmetric.   Psychiatric:         Behavior: Behavior is cooperative.         Labs:  Lab Results   Component Value Date    HGBA1C 6.3 07/08/2024    TSH 1.09 08/02/2023    FREET4 1.14 06/11/2019      No results found for: \"PR1\", \"THYROIDPAB\", \"TSI\"     Assessment/Plan   Assessment & Plan  Type 2 diabetes mellitus without complication, without long-term current use of insulin (Multi)  Fasting labs due  Doing great on current meds    Orders:    CBC; Future    Comprehensive Metabolic Panel; Future    Lipid Panel; Future    Hemoglobin A1C; Future    Albumin-Creatinine Ratio, Urine Random; Future    Hyperlipidemia, unspecified hyperlipidemia " type    Fasting labs due  Hypertension, unspecified type    Bp excellent  Follow up at main campus due to my moving locations      Electronically signed by:  Felicitas German MD 03/27/25 8:23 AM

## 2025-03-28 ENCOUNTER — APPOINTMENT (OUTPATIENT)
Dept: AUDIOLOGY | Facility: HOSPITAL | Age: 80
End: 2025-03-28
Payer: COMMERCIAL

## 2025-04-01 DIAGNOSIS — Z00.00 HEALTHCARE MAINTENANCE: ICD-10-CM

## 2025-04-01 RX ORDER — TAMSULOSIN HYDROCHLORIDE 0.4 MG/1
0.8 CAPSULE ORAL DAILY
Qty: 90 CAPSULE | Refills: 3 | Status: SHIPPED | OUTPATIENT
Start: 2025-04-01

## 2025-04-03 LAB
ALBUMIN SERPL-MCNC: 4.5 G/DL (ref 3.6–5.1)
ALP SERPL-CCNC: 41 U/L (ref 35–144)
ALT SERPL-CCNC: 11 U/L (ref 9–46)
ANION GAP SERPL CALCULATED.4IONS-SCNC: 12 MMOL/L (CALC) (ref 7–17)
AST SERPL-CCNC: 13 U/L (ref 10–35)
BILIRUB SERPL-MCNC: 0.4 MG/DL (ref 0.2–1.2)
BUN SERPL-MCNC: 21 MG/DL (ref 7–25)
CALCIUM SERPL-MCNC: 9.8 MG/DL (ref 8.6–10.3)
CHLORIDE SERPL-SCNC: 100 MMOL/L (ref 98–110)
CHOLEST SERPL-MCNC: 131 MG/DL
CHOLEST/HDLC SERPL: 2.8 (CALC)
CO2 SERPL-SCNC: 27 MMOL/L (ref 20–32)
CREAT SERPL-MCNC: 0.96 MG/DL (ref 0.7–1.22)
EGFRCR SERPLBLD CKD-EPI 2021: 80 ML/MIN/1.73M2
ERYTHROCYTE [DISTWIDTH] IN BLOOD BY AUTOMATED COUNT: 13.5 % (ref 11–15)
EST. AVERAGE GLUCOSE BLD GHB EST-MCNC: 134 MG/DL
EST. AVERAGE GLUCOSE BLD GHB EST-SCNC: 7.4 MMOL/L
GLUCOSE SERPL-MCNC: 121 MG/DL (ref 65–99)
HBA1C MFR BLD: 6.3 % OF TOTAL HGB
HCT VFR BLD AUTO: 38.7 % (ref 38.5–50)
HDLC SERPL-MCNC: 46 MG/DL
HGB BLD-MCNC: 13.1 G/DL (ref 13.2–17.1)
LDLC SERPL CALC-MCNC: 68 MG/DL (CALC)
MCH RBC QN AUTO: 31.4 PG (ref 27–33)
MCHC RBC AUTO-ENTMCNC: 33.9 G/DL (ref 32–36)
MCV RBC AUTO: 92.8 FL (ref 80–100)
NONHDLC SERPL-MCNC: 85 MG/DL (CALC)
PLATELET # BLD AUTO: 200 THOUSAND/UL (ref 140–400)
PMV BLD REES-ECKER: 9.5 FL (ref 7.5–12.5)
POTASSIUM SERPL-SCNC: 4.9 MMOL/L (ref 3.5–5.3)
PROT SERPL-MCNC: 7 G/DL (ref 6.1–8.1)
RBC # BLD AUTO: 4.17 MILLION/UL (ref 4.2–5.8)
SODIUM SERPL-SCNC: 139 MMOL/L (ref 135–146)
TRIGL SERPL-MCNC: 85 MG/DL
WBC # BLD AUTO: 5.8 THOUSAND/UL (ref 3.8–10.8)

## 2025-04-04 ENCOUNTER — CLINICAL SUPPORT (OUTPATIENT)
Dept: AUDIOLOGY | Facility: HOSPITAL | Age: 80
End: 2025-04-04
Payer: COMMERCIAL

## 2025-04-04 DIAGNOSIS — H90.3 SENSORINEURAL HEARING LOSS, BILATERAL: Primary | ICD-10-CM

## 2025-04-04 PROCEDURE — 92557 COMPREHENSIVE HEARING TEST: CPT | Performed by: AUDIOLOGIST

## 2025-04-04 PROCEDURE — 92550 TYMPANOMETRY & REFLEX THRESH: CPT | Mod: 52 | Performed by: AUDIOLOGIST

## 2025-04-04 NOTE — PROGRESS NOTES
Chief Complaint   Patient presents with    Hearing Loss    Hearing Aid Check       HISTORY:  Farooq Hendrix, age 79 years, was seen for audiogram and hearing aid check on 4/4/2025.  Mr. Hendrix presents with documented bilateral sensorineural hearing loss.  Last audiogram was completed July 2022.  He was fit with his most recent set of phonak audeo L90RT hearing aids 11/2023.  Serial numbers are [id number] right and [id number] left.  Both aids are under warranty through 1/27/2027.  Mr. Hendrix reports daily use of hearing aids.  He reports better speech understanding with the new hearing aids.  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  He can hear questions from the audience better with the newer hearing aids.  He continues to report difficulty understanding foreign accents.  There is no report of ear pain, ear pressure, middle ear pathology, ear drainage, tinnitus or vertigo.  He is seen by otolaryngology twice a year for cerumen removal.  Please see medical records for complete history.    RESULTS:  Prior to testing both external auditory canals were clear and tympanic membranes visualized    Immittance and acoustic reflexes:  Immittance testing yielded TYPE A tympanograms indicating normal middle ear function both ears  Acoustic reflexes were absent 500 - 4000 Hz both ears    Audiogram:  Mild to moderate sensorineural hearing loss 125 - 8000 Hz both ears  Speech reception thresholds obtained at 40 dBHL right ear and 45 dBHL left ear  Speech discrimination scores were 100% at 75 dBHL (MCL) right ear and 92% at 80 dBHL (MCL) left ear.    IMPRESSIONS:  Normal middle ear function noted both ears  Mild to moderate sensorineural hearing loss    Hearing aids were checked, they are clean and working well.  Reviewed changing of wax traps.  Mr. Hendrix was provided two pack of wax traps today.    RECOMMENDATIONS:  1.  Follow up with referring provider  2.  Retest hearing levels annually  3.  Daily use of  hearing aids    time: 5264 - 4676

## 2025-04-07 ENCOUNTER — APPOINTMENT (OUTPATIENT)
Dept: PRIMARY CARE | Facility: CLINIC | Age: 80
End: 2025-04-07
Payer: COMMERCIAL

## 2025-04-07 VITALS
OXYGEN SATURATION: 97 % | DIASTOLIC BLOOD PRESSURE: 69 MMHG | BODY MASS INDEX: 26.12 KG/M2 | TEMPERATURE: 97.1 F | HEIGHT: 64 IN | SYSTOLIC BLOOD PRESSURE: 110 MMHG | HEART RATE: 58 BPM | WEIGHT: 153 LBS

## 2025-04-07 DIAGNOSIS — G89.29 CHRONIC BILATERAL LOW BACK PAIN WITHOUT SCIATICA: ICD-10-CM

## 2025-04-07 DIAGNOSIS — G89.29 CHRONIC PAIN OF LEFT LOWER EXTREMITY: ICD-10-CM

## 2025-04-07 DIAGNOSIS — Z00.00 MEDICARE ANNUAL WELLNESS VISIT, SUBSEQUENT: Primary | ICD-10-CM

## 2025-04-07 DIAGNOSIS — Z00.00 HEALTHCARE MAINTENANCE: ICD-10-CM

## 2025-04-07 DIAGNOSIS — M54.50 CHRONIC BILATERAL LOW BACK PAIN WITHOUT SCIATICA: ICD-10-CM

## 2025-04-07 DIAGNOSIS — F33.2 SEVERE EPISODE OF RECURRENT MAJOR DEPRESSIVE DISORDER, WITHOUT PSYCHOTIC FEATURES (MULTI): ICD-10-CM

## 2025-04-07 DIAGNOSIS — N40.1 BENIGN PROSTATIC HYPERPLASIA WITH LOWER URINARY TRACT SYMPTOMS, SYMPTOM DETAILS UNSPECIFIED: ICD-10-CM

## 2025-04-07 DIAGNOSIS — M79.605 CHRONIC PAIN OF LEFT LOWER EXTREMITY: ICD-10-CM

## 2025-04-07 DIAGNOSIS — E11.42 TYPE 2 DIABETES MELLITUS WITH DIABETIC POLYNEUROPATHY, WITHOUT LONG-TERM CURRENT USE OF INSULIN: ICD-10-CM

## 2025-04-07 DIAGNOSIS — G47.33 OBSTRUCTIVE SLEEP APNEA SYNDROME, SEVERE: ICD-10-CM

## 2025-04-07 DIAGNOSIS — E78.5 HYPERLIPIDEMIA, UNSPECIFIED HYPERLIPIDEMIA TYPE: ICD-10-CM

## 2025-04-07 PROBLEM — F39 MOOD DISORDER (CMS-HCC): Status: RESOLVED | Noted: 2023-09-19 | Resolved: 2025-04-07

## 2025-04-07 PROBLEM — C80.1 MALIGNANT NEOPLASM (MULTI): Status: RESOLVED | Noted: 2021-08-09 | Resolved: 2025-04-07

## 2025-04-07 PROBLEM — C44.91 BASAL CELL CARCINOMA: Status: RESOLVED | Noted: 2018-03-14 | Resolved: 2025-04-07

## 2025-04-07 PROCEDURE — 1160F RVW MEDS BY RX/DR IN RCRD: CPT | Performed by: STUDENT IN AN ORGANIZED HEALTH CARE EDUCATION/TRAINING PROGRAM

## 2025-04-07 PROCEDURE — 1158F ADVNC CARE PLAN TLK DOCD: CPT | Performed by: STUDENT IN AN ORGANIZED HEALTH CARE EDUCATION/TRAINING PROGRAM

## 2025-04-07 PROCEDURE — G0439 PPPS, SUBSEQ VISIT: HCPCS | Performed by: STUDENT IN AN ORGANIZED HEALTH CARE EDUCATION/TRAINING PROGRAM

## 2025-04-07 PROCEDURE — 3074F SYST BP LT 130 MM HG: CPT | Performed by: STUDENT IN AN ORGANIZED HEALTH CARE EDUCATION/TRAINING PROGRAM

## 2025-04-07 PROCEDURE — 3078F DIAST BP <80 MM HG: CPT | Performed by: STUDENT IN AN ORGANIZED HEALTH CARE EDUCATION/TRAINING PROGRAM

## 2025-04-07 PROCEDURE — 99214 OFFICE O/P EST MOD 30 MIN: CPT | Performed by: STUDENT IN AN ORGANIZED HEALTH CARE EDUCATION/TRAINING PROGRAM

## 2025-04-07 PROCEDURE — 1170F FXNL STATUS ASSESSED: CPT | Performed by: STUDENT IN AN ORGANIZED HEALTH CARE EDUCATION/TRAINING PROGRAM

## 2025-04-07 PROCEDURE — 1036F TOBACCO NON-USER: CPT | Performed by: STUDENT IN AN ORGANIZED HEALTH CARE EDUCATION/TRAINING PROGRAM

## 2025-04-07 PROCEDURE — 1125F AMNT PAIN NOTED PAIN PRSNT: CPT | Performed by: STUDENT IN AN ORGANIZED HEALTH CARE EDUCATION/TRAINING PROGRAM

## 2025-04-07 PROCEDURE — 99397 PER PM REEVAL EST PAT 65+ YR: CPT | Performed by: STUDENT IN AN ORGANIZED HEALTH CARE EDUCATION/TRAINING PROGRAM

## 2025-04-07 PROCEDURE — 1123F ACP DISCUSS/DSCN MKR DOCD: CPT | Performed by: STUDENT IN AN ORGANIZED HEALTH CARE EDUCATION/TRAINING PROGRAM

## 2025-04-07 PROCEDURE — 1159F MED LIST DOCD IN RCRD: CPT | Performed by: STUDENT IN AN ORGANIZED HEALTH CARE EDUCATION/TRAINING PROGRAM

## 2025-04-07 ASSESSMENT — ACTIVITIES OF DAILY LIVING (ADL)
MANAGING_FINANCES: INDEPENDENT
GROCERY_SHOPPING: INDEPENDENT
DOING_HOUSEWORK: INDEPENDENT
DRESSING: INDEPENDENT
BATHING: INDEPENDENT
TAKING_MEDICATION: INDEPENDENT

## 2025-04-07 ASSESSMENT — PATIENT HEALTH QUESTIONNAIRE - PHQ9
SUM OF ALL RESPONSES TO PHQ9 QUESTIONS 1 AND 2: 0
2. FEELING DOWN, DEPRESSED OR HOPELESS: NOT AT ALL
1. LITTLE INTEREST OR PLEASURE IN DOING THINGS: NOT AT ALL

## 2025-04-07 ASSESSMENT — PAIN SCALES - GENERAL: PAINLEVEL_OUTOF10: 7

## 2025-04-07 NOTE — PROGRESS NOTES
" Subjective   Reason for Visit: Farooq Hendrix is an79 y.o. male who presents for a Medicare Wellness visit.    Past Medical, Surgical, and Family History reviewed and updated in chart.    Reviewed all medications by prescribing practitioner or clinical pharmacist (such as prescriptions, OTCs, herbal therapies and supplements) and documented in the medical record.    History of Present Illness  Farooq, a patient with a history of diabetes, presents for a routine check-up and to discuss a referral to an endocrinologist. He prefers to see a specialist at the main campus for convenience. His blood sugar levels are well-controlled, and he does not regularly check his blood glucose at home. He reports a tingling sensation on the bottom of his feet, which he has grown accustomed to over several years. He is unsure if this is due to diabetic neuropathy or age.    Additionally, Farooq has been experiencing pain in the lateral aspect of his left leg for the past three to four weeks. The pain is triggered by sneezing, coughing, making a bowel movement, crossing his legs, and turning in bed. He has not sought treatment for this pain and reports that it is not constant but varies in intensity.    Farooq also mentions that he is on several psychiatric medications, including venlafaxine and trazodone, for depression and sleep issues. He is currently dealing with the stress of potentially not having his marin renewed and recently moved houses, which he found to be a significant event. Despite these stressors, he reports his mood as \"pretty good.\"    Re: HM - CRS and PSA no longer indicated. Shots UTD.       PMHx, FHx, Social Hx, Surg Hx personally reviewed at this appointment. No pertinent findings and/or changes from prior (if applicable).    ROS: Denies wt gain/loss f/c HA LoC CP SOB NVDC. See HPI above, and scanned sheet (if applicable). All other systems are reviewed and are without complaint.       Objective     BP " "110/69   Pulse 58   Temp 36.2 °C (97.1 °F)   Ht 1.626 m (5' 4\")   Wt 69.4 kg (153 lb)   SpO2 97%   BMI 26.26 kg/m²      Physical Exam  Gen: NAD. AAO x3.  HEENT: NC/AT. Anicteric sclera, symmetric pupils. MMM no thrush.  Neck: Soft, supple. No LAD. No goiter.  CV: RRR nl s1s2 no m/r/g  Pulm: CTAB no w/r/r, good air exchange  GI: soft NTND BS+ no hsm  Ext: WWP no edema  Neuro: II-XII grossly intact, nonfocal systemic findings  MSK: 5/5 strength b/l UE and LE. Tight hip flexors.   Gait: unremarkable   Feet: no ulcers, nl monofilament testing         Lab Results   Component Value Date    WBC 5.8 04/02/2025    HGB 13.1 (L) 04/02/2025    HCT 38.7 04/02/2025     04/02/2025    CHOL 131 04/02/2025    TRIG 85 04/02/2025    HDL 46 04/02/2025    ALT 11 04/02/2025    AST 13 04/02/2025     04/02/2025    K 4.9 04/02/2025     04/02/2025    CREATININE 0.96 04/02/2025    BUN 21 04/02/2025    CO2 27 04/02/2025    TSH 1.09 08/02/2023    PSA 0.53 08/10/2020    HGBA1C 6.3 (H) 04/02/2025     par     ELECTROCARDIOGRAM RHYTHM STRIP  Ordered by an unspecified provider.        Current Outpatient Medications   Medication Instructions    armodafinil (NUVIGIL) 50 mg, oral, 2 times daily    ciclopirox (Loprox) 0.77 % cream APPLY TO AFFECTED AREA IN GROIN TWICE A DAY    docusate sodium (COLACE) 100 mg, 2 times daily    fluoride, sodium, (Prevident 5000 Plus) 1.1 % dental cream PLEASE SEE ATTACHED FOR DETAILED DIRECTIONS    fluticasone (Flonase) 50 mcg/actuation nasal spray 2 sprays, Each Nostril, Daily    Lipitor 10 mg, oral, Daily    lisinopril 10 mg, oral, Daily    metFORMIN (GLUCOPHAGE) 1,000 mg, oral, 2 times daily    OLANZapine (ZYPREXA) 2.5 mg, oral, Daily    Ozempic 0.5 mg, subcutaneous, Once Weekly    tamsulosin (FLOMAX) 0.8 mg, oral, Daily    traZODone (DESYREL) 50 mg, oral, Nightly, TAKE 1 TABLET BY MOUTH AT BEDTIME    triamcinolone (Kenalog) 0.1 % cream APPLY SPARINGLY TO AFFECTED AREA(S) 2 TO 3 TIMES DAILY.    " venlafaxine 150 mg, oral, Daily          Assessment & Plan  Left leg pain  Intermittent lateral left leg pain, likely musculoskeletal strain or nerve compression.  - Refer to physical therapy for left leg pain.    Type 2 diabetes mellitus  Well-controlled Type 2 diabetes with excellent A1c. Referred to endocrinologist for further management.  - Refer to endocrinologist Dr. Lakisha Guerrero for diabetes management.  - Renew chronic medications.    Tingling in feet  Chronic tingling in feet, likely diabetic or age-related neuropathy.  Continue to monitor    Depression  Depression managed with venlafaxine and trazodone. Mood stable despite stressors. Continues psychiatric care.    Sleep apnea  Inconsistent CPAP use due to recent move. Encouraged to use CPAP by psychiatrist and myaself    Follow-up  Requires follow-up for condition management and medication renewal.  - Leave a urine sample at the lab for analysis.  - Renew blood work.  - Follow up with specialists as needed.    # Health Maintenance  - routine blood work  - Colon Cancer Screening: no longer indicated due to age   - PSA: no longer indicated due to age   - Immunizations: tetanus shot at pharmacy  - AAA screening:  not indicated        Assessment & Plan  Severe episode of recurrent major depressive disorder, without psychotic features (Multi)         Benign prostatic hyperplasia with lower urinary tract symptoms, symptom details unspecified         Type 2 diabetes mellitus with diabetic polyneuropathy, without long-term current use of insulin         Hyperlipidemia, unspecified hyperlipidemia type         Obstructive sleep apnea syndrome, severe         Chronic bilateral low back pain without sciatica         Healthcare maintenance         Medicare annual wellness visit, subsequent         Chronic pain of left lower extremity    Orders:    Referral to Physical Therapy; Future         Bran Shin MD            This medical note was created with the  assistance of artificial intelligence (AI) for documentation purposes. The content has been reviewed and confirmed by the healthcare provider for accuracy and completeness. Patient consented to the use of audio recording and use of AI during their visit.

## 2025-04-07 NOTE — PATIENT INSTRUCTIONS
Your blood work is up to date; no need for additional draw at this appointment. Get your urine test at the lab.     I previously renewed your medications in advance of today's appointment.     I have referred you to physical therapy to help with strengthening, stretching, and balance. Please call [phone] to schedule at a convenient  location.     I have referred you to an endocrinologist at [facility] per your request. Dr. Guerrero's office should reach out to you.     Follow up with your specialists as previously scheduled.

## 2025-04-08 LAB
ALBUMIN/CREAT UR: 4 MG/G CREAT
CREAT UR-MCNC: 112 MG/DL (ref 20–320)
MICROALBUMIN UR-MCNC: 0.4 MG/DL

## 2025-04-21 DIAGNOSIS — F41.8 DEPRESSION WITH ANXIETY: ICD-10-CM

## 2025-04-21 RX ORDER — OLANZAPINE 2.5 MG/1
2.5 TABLET ORAL DAILY
Qty: 90 TABLET | Refills: 1 | Status: SHIPPED | OUTPATIENT
Start: 2025-04-21

## 2025-04-28 DIAGNOSIS — F41.8 DEPRESSION WITH ANXIETY: ICD-10-CM

## 2025-04-28 RX ORDER — ARMODAFINIL 50 MG/1
50 TABLET ORAL 2 TIMES DAILY
Qty: 180 TABLET | Refills: 0 | Status: SHIPPED | OUTPATIENT
Start: 2025-04-28

## 2025-04-30 ENCOUNTER — EVALUATION (OUTPATIENT)
Dept: PHYSICAL THERAPY | Facility: HOSPITAL | Age: 80
End: 2025-04-30
Payer: COMMERCIAL

## 2025-04-30 DIAGNOSIS — G89.29 CHRONIC PAIN OF LEFT LOWER EXTREMITY: ICD-10-CM

## 2025-04-30 DIAGNOSIS — M79.605 LEFT LEG PAIN: Primary | ICD-10-CM

## 2025-04-30 DIAGNOSIS — M79.605 CHRONIC PAIN OF LEFT LOWER EXTREMITY: ICD-10-CM

## 2025-04-30 PROCEDURE — 97140 MANUAL THERAPY 1/> REGIONS: CPT | Mod: GP | Performed by: PHYSICAL THERAPIST

## 2025-04-30 PROCEDURE — 97161 PT EVAL LOW COMPLEX 20 MIN: CPT | Mod: GP | Performed by: PHYSICAL THERAPIST

## 2025-04-30 ASSESSMENT — ENCOUNTER SYMPTOMS
LOSS OF SENSATION IN FEET: 0
OCCASIONAL FEELINGS OF UNSTEADINESS: 0
DEPRESSION: 0

## 2025-04-30 NOTE — PROGRESS NOTES
Physical Therapy Evaluation and Treatment      Patient Name:Farooq Hendrix   MRN:[id number]   Today's Date:2025   Referred by: Bran Shin   Time Calculation  Start Time: 345  Stop Time: 430  Time Calculation (min): 45 min    PT Evaluation Time Entry  PT Evaluation (Low) Time Entry: 25  PT Therapeutic Procedures Time Entry  Manual Therapy Time Entry: 15  Self-Care/Home Mgmt Trainin       Therapy Diagnosis:  Problem List Items Addressed This Visit           ICD-10-CM       Musculoskeletal and Injuries    Left leg pain - Primary M79.605    Relevant Orders    Follow Up In Physical Therapy     Other Visit Diagnoses         Codes      Chronic pain of left lower extremity     M79.605, G89.29          Visit number: 1 for        Assessment:   78 yo male seen in the past for LBP, sx mostly resolved, pt returns to PT with new c/o intermittent pain L post thigh with transitioning movement including sit to stand, getting in and out of car/bed, at time of evaluation pt displays functional ROM, WNL palpation, but with pain with L SLR end range testing and with L HS testing, pt tends to place his phone in his L back pocket, given his intermittent sx and mostly WNL findings, suspect this could be due to sciatic nerve irritation from compression, today worked on manual STM and sciatic nerve gliding stretching, recommend pt to remove phone or other objects from L back pocket, expect this would improve his sx, will follow up in 3 weeks if needed, pt will call if any questions. Pt agreed with plan.          Plan: Plan of care was developed with input and agreement by the patient.     Recheck in 3 weeks, reassess if sx not able to improve.  If sx has resolved, pt can cancel follow up appt.      PT Plan: Skilled PT  PT Frequency:  (recheck in 3 weeks, sooner if needed)  Onset Date: 25  Number of Treatments Authorized: MMO 30V  Rehab Potential: Good  Plan of Care Agreement: Patient    Current Problem:   1. Left  leg pain  Follow Up In Physical Therapy      2. Chronic pain of left lower extremity  Referral to Physical Therapy          Subjective    General:  General  Reason for Referral: chronic pain R LE  Referred By: Bran crandall MD  Past Medical History Relevant to Rehab: HTN, DM  Preferred Learning Style: verbal, visual, written  Precautions:  Precautions  DAVIDADI Fall Risk Score (The score of 4 or more indicates an increased risk of falling): 1    HPI: pt was last seen in PT for LBP summer 2024 and states overall he was doing well, pt states noticed onset of L lateral thigh pain mostly associated with transition motions (getting in and out of bed, getting in and out of car, sit to stand), referred to PT by PCP.     IMAGING: none recently.     PMH: DM, sleep apnea, HTN,     MEDICATIONS:  none.     SOCIAL HX/JOB STATUS: working fulltime as professor,  active life style.  Tries to walk stairs during the day.     BASELINE FUNCTION: working 1x/week with  stationary bike 15min, some light free weights 15min for the past 6 months,     Pain:     PAIN: current 5-6/10  Worst 8-9/10      Best 1/10  description and location of pain: L lateral thigh as ache.     Pain aggravated by: transition sit to stand, in and out of car or bed, quickly subside. Sometimes with prolonged standing.    Pain relieved by:   resting,     Home Living:     Prior Level of Function:     Patient previously independent with all ADLs  Exercise/Physical Activity: yes works out with a  1x/week for general conditioning and strengthening    Patient's Goal for Treatment:  relieving pain, reducing symptoms.     Precautions/Fall Risk:fall risk low Pacemaker no  Seizures No  Post Op Movement/Restrictions No    Vital Signs:       Objective   OBSERVATION: increased T spine kyphosis, rounded shoulder posture, even pelvis in standing.      PALPATION: not painful to palpation L post pelvis, L LE post.      SENSATION: WNL       STRENGTH:  Able to  supine bridge single leg with slight loss of pelvic clearance. And most discomfort along Lower back, OK with L LE  Lower abdominals: supine 90-90 and slight reverse DKTC with discomfort on lower back, no pain with L LE.        MYOTOMES: WNL B LE.     FUNCTIONAL STRENGTH:  With HHA able to complete heel walk, toe walk, double leg 1/3 squat and single leg minisquat, slight discomfort L post thigh with knee flex MMT, squats.    Balance with HHA SLS 10sec, 3sec without HHA   Gait amb on level with no significant deviations.      SPECIAL TEST:  Pelvis alignment: even   SLR neg L side but with discomfort along L post thigh,   cross SLR neg     LEFS 46/80    Outcome Measures:  Other Measures  Oswestry Disablity Index (SEKOU): 46     Treatments:     Treatment Performed Today:   Treatment provided today:   Educated patient on evaluation findings and POC, expectations and course of PT, questions addressed.   Manual therapy: manual sciatic nerve gliding in supine followed by STM along L HS, post pelvis to lower back, tolerated well, with ease of sx after  Recommended pt to avoid putting wallet and phone in L back pocket.    Response to Treatment: improved knowledge and understanding of condition    Education/Resources provided today: Home Program   [employer]:       OP EDUCATION:  Outpatient Education  Individual(s) Educated: Patient  Education Provided: POC  Patient/Caregiver Demonstrated Understanding: yes  Plan of Care Discussed and Agreed Upon: yes  Patient Response to Education: Patient/Caregiver Verbalized Understanding of Information    Goals:  To be met at time of discharge:  -- Decrease pain to 1/10_.  -- Independent with HEP.  -- Functional outcome:   Able to continue with daily activities involving sit to stand, getting in and out of car or bed and other transitions with pain <1/10.   Able to continue with his exercises routine with  with no increased pain.

## 2025-05-06 ENCOUNTER — APPOINTMENT (OUTPATIENT)
Dept: BEHAVIORAL HEALTH | Facility: CLINIC | Age: 80
End: 2025-05-06
Payer: COMMERCIAL

## 2025-05-06 VITALS
HEART RATE: 62 BPM | BODY MASS INDEX: 26.78 KG/M2 | TEMPERATURE: 97.3 F | SYSTOLIC BLOOD PRESSURE: 112 MMHG | WEIGHT: 156 LBS | RESPIRATION RATE: 16 BRPM | DIASTOLIC BLOOD PRESSURE: 70 MMHG

## 2025-05-06 DIAGNOSIS — F41.8 OTHER SPECIFIED ANXIETY DISORDERS: ICD-10-CM

## 2025-05-06 DIAGNOSIS — F41.8 DEPRESSION WITH ANXIETY: ICD-10-CM

## 2025-05-06 PROCEDURE — 1126F AMNT PAIN NOTED NONE PRSNT: CPT | Performed by: PSYCHIATRY & NEUROLOGY

## 2025-05-06 PROCEDURE — 3078F DIAST BP <80 MM HG: CPT | Performed by: PSYCHIATRY & NEUROLOGY

## 2025-05-06 PROCEDURE — 1123F ACP DISCUSS/DSCN MKR DOCD: CPT | Performed by: PSYCHIATRY & NEUROLOGY

## 2025-05-06 PROCEDURE — 99213 OFFICE O/P EST LOW 20 MIN: CPT | Performed by: PSYCHIATRY & NEUROLOGY

## 2025-05-06 PROCEDURE — 3074F SYST BP LT 130 MM HG: CPT | Performed by: PSYCHIATRY & NEUROLOGY

## 2025-05-06 RX ORDER — VENLAFAXINE HYDROCHLORIDE 150 MG/1
150 TABLET, EXTENDED RELEASE ORAL DAILY
Qty: 90 TABLET | Refills: 1 | Status: SHIPPED | OUTPATIENT
Start: 2025-05-06

## 2025-05-06 RX ORDER — LORAZEPAM 1 MG/1
TABLET ORAL
Qty: 5 TABLET | Refills: 0 | Status: SHIPPED | OUTPATIENT
Start: 2025-05-06

## 2025-05-06 ASSESSMENT — PAIN SCALES - GENERAL: PAINLEVEL_OUTOF10: 0-NO PAIN

## 2025-05-06 ASSESSMENT — COLUMBIA-SUICIDE SEVERITY RATING SCALE - C-SSRS
2. HAVE YOU ACTUALLY HAD ANY THOUGHTS OF KILLING YOURSELF?: NO
1. HAVE YOU WISHED YOU WERE DEAD OR WISHED YOU COULD GO TO SLEEP AND NOT WAKE UP?: NO
1. SINCE LAST CONTACT, HAVE YOU WISHED YOU WERE DEAD OR WISHED YOU COULD GO TO SLEEP AND NOT WAKE UP?: NO
2. HAVE YOU ACTUALLY HAD ANY THOUGHTS OF KILLING YOURSELF?: NO

## 2025-05-06 NOTE — PATIENT INSTRUCTIONS
Plan:   - Continue to monitor for any changes in mood.   - Can take lorazepam 0.5mg as needed for flying. If needed, can take another 0.5mg. Monitor for any side effects, including excessive sedation, incoordination.   - Continue current medications:   Venlafaxine ER 150mg once daily  Olanzapine 2.5mg at bedtime  Armodafinil 100mg daily  Trazodone 50mg at bedtime  - Continue Vit D and Vit B12 supplementation.   - Continue psychotherapy.   - Continue using CPAP for sleep apnea.  - Follow up with me in 3 months.   - Contact via Vimbly or call sooner ([phone]) in case of any questions or concerns.  - Call [phone] for mental health crisis or suicidal thoughts, or call 911 or go to the nearest emergency room for emergencies.

## 2025-05-06 NOTE — PROGRESS NOTES
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 Seen in office today for follow up visit.     Subjective:     He says he is \"pretty good.\"     He says they moved to a [employer] and sold their house.   He says things have been \"crazy.\" He says he is going through old things and says it is \"sort of fun.\"     He says he is going to [place] for 11 days. He asks if he can take something for sleep. He asks about Ambien or something else to help him in the plane. He has taken ambien for sleep before and has also taken lorazepam in the past for flying.      He says his marin scored his marin but it did not score well enough to be funded; he is following up with the . He says his lab personnel is \"shrinking.\" He says he seems to be \"calm\" about these things.     He says his mood is \"okay.\"   Denies anhedonia.   Appetite - suppressed on Ozempic. Weight has decreased slightly.   He says he is sleeping more. He is taking trazodone 50mg at bedtime. He just started using CPAP again.   He works with a  at home.   Denies any death wishes or suicidal thoughts, intent or plans.     Reports longstanding anxiety. He says that he has had trouble making decisions all his life.   He says he has had negative thoughts about himself - he is reading a book to correct this.   No panic attacks.     OCD - Used to have OCD in the past. No significant compulsions and obsessions recently.     Denies any manic or hypomanic episodes.      Denies AVH, paranoia or delusions.     He says he has had problems with pain in leg; has started physical therapy.     He talks to someone weekly from [place] on Zoom for psychotherapy (Florencia Grant).    He is going to [place] in May for a meeting and will be seeing a friend there.   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     His son is in town (lives in [place]); " "they are going to visit soon.     Current medications:   Venlafaxine ER 150mg once daily  Olanzapine 2.5mg at bedtime. (He says he stopped olanzapine once after a psychologist advised to do so and his mood became really bad.)  Trazodone 50mg one tablet at bedtime; decreased from 100mg.   Armodafinil 50mg two tablets every morning - helps with motivation. He says he tried to lower Armodafinil but felt the symptoms got worse, so he went back to 100mg/day.      Past medications:   Fluoxetine - on it for a while.   Amitriptyline - had visual side effects.     Psychiatric Review Of Systems:   As above.     Medical Review Of Systems:    Pertinent items are noted in HPI.    Record Review: brief     Mental Status Evaluation:  Appearance: Fair grooming.   Behavior/Attitude: Cooperative. Pleasant.   Motor: Psychomotor activity in average range. No abnormal involuntary movements.  Speech: Somewhat soft, regular rate. No pressure.  Mood: \"okay.\"  Affect: Congruent to stated mood. Mobilized appropriately. Normal range.   Thought process: Goal-directed. Linear. Organized.  Thought content: No paranoia, delusion or ideas of reference elicited. No hallucinations in auditory, visual or other sensory modalities.   Suicidal ideation: denied.  Homicidal ideation: denied.   Insight: Fair.  Judgment: Fair.  Recent and remote memory: intact based on recall of recent and remote autobiographical memories.  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 Seen in office for follow up visit today.      Initial impression:   8/1/2023 - He has a long history of depression and anxiety which seem fairly well-controlled on current medication regimen. He also had OCD symptoms in the past but has not had " significant symptoms recently.     5/6/25 - Stable mood but has been dealing with stress (recent move, marin re-submission). Anxiety is manageable. He is flying to [place] soon and needs something for sleep.      Diagnosis:   Major depressive disorder, recurrent, in partial remission  Other specified anxiety disorder  H/O OCD.     Treatment Plan/Recommendations:   - Continue to monitor for any changes in mood.   - Can take lorazepam 0.5mg as needed for flying. If needed, can take another 0.5mg. Monitor for any side effects, including excessive sedation, incoordination.   - Continue current medications:   Venlafaxine ER 150mg once daily  Olanzapine 2.5mg at bedtime  Armodafinil 100mg daily  Trazodone 50mg at bedtime  - Continue Vit D and Vit B12 supplementation.   - Continue psychotherapy.   - Provided supportive therapy.   - Recommended using CPAP for sleep apnea.  - Follow up with me in 3 months.   - Contact sooner if needed.       Review with patient: Treatment plan reviewed with the patient.    Mohini Tello MD.

## 2025-05-22 ENCOUNTER — APPOINTMENT (OUTPATIENT)
Dept: OTOLARYNGOLOGY | Facility: CLINIC | Age: 80
End: 2025-05-22
Payer: COMMERCIAL

## 2025-05-29 ENCOUNTER — TREATMENT (OUTPATIENT)
Dept: PHYSICAL THERAPY | Facility: HOSPITAL | Age: 80
End: 2025-05-29
Payer: COMMERCIAL

## 2025-05-29 DIAGNOSIS — M79.605 LEFT LEG PAIN: Primary | ICD-10-CM

## 2025-05-29 PROCEDURE — 97110 THERAPEUTIC EXERCISES: CPT | Mod: GP | Performed by: PHYSICAL THERAPIST

## 2025-05-29 PROCEDURE — 97140 MANUAL THERAPY 1/> REGIONS: CPT | Mod: GP | Performed by: PHYSICAL THERAPIST

## 2025-05-29 NOTE — PROGRESS NOTES
Physical Therapy Treatment    Patient Name:Farooq Hendrix   MRN:[id number]   Today's Date:5/29/2025   Referred by: Bran Shin   Time Calculation  Start Time: 1000  Stop Time: 1040  Time Calculation (min): 40 min       PT Therapeutic Procedures Time Entry  Manual Therapy Time Entry: 10  Therapeutic Exercise Time Entry: 30       Insurance  Visit number: 2   Approved number of visits: 30  Auth required: No  Authorization date range:   Onset Date: 4/7/2025    Payor: [employer] [facility] / Plan: [employer] / Product Type: *No Product type* /     Assessment:   79 yo male seen in the past for LBP, sx mostly resolved, pt returns to PT with new c/o intermittent pain L post thigh with transitioning movement including sit to stand, getting in and out of car/bed, at time of evaluation pt displays functional ROM, WNL palpation, but with pain with L SLR end range testing and with L HS testing, worked on STM and pt was instructed to avoid putting phone in backpocket.  Pt now returns with more frequent pain L lateral and post thigh with sitting, standing, transition and with straining activities, suspect possibly due to degenerative changes L spine, otherwise no significant findings, today worked on STM, instructed in gentle stretching and glut strengthening, handout issued, will recheck in a week, pt is to call if any questions.      Plan:  Recheck in a week.     Precautions/Fall Risk:  Fall Risk: Low based on professional judgment  Pacemaker: no    Seizures: No    Post Op Movement/Restrictions: No:  Weight Bearing Status: As tolerated  Other Medical precautions:      OP PT Plan  PT Plan: Skilled PT  PT Frequency:  (recheck in 3 weeks, sooner if needed)  Onset Date: 04/07/25  Number of Treatments Authorized: MMO 30V  Rehab Potential: Good  Plan of Care Agreement: Patient    Therapy Diagnosis   Problem List Items Addressed This Visit           ICD-10-CM       Musculoskeletal and Injuries    Left leg pain -  Primary M79.605       Current Problem  1. Left leg pain  Follow Up In Physical Therapy              Subjective/Pain:  Current Pain Level (0-10): 5  Pt states now feels more pain along L lateral thigh and post thigh, no associated pain on lower back, feeling in with sitting, standing, coughing, sneezing, straining, sx for the past 1-2 weeks.    HEP Compliance: Fair    General:  General  Reason for Referral: chronic pain R LE  Referred By: Bran crandall MD  Past Medical History Relevant to Rehab: HTN, DM  Preferred Learning Style: verbal, visual, written  Precautions:       Objective/Outcome Measures:  Recheck:  Forward T with HHA x5 R/L, L WB with pain along L HS  Double leg chair squat to 22in no HHA x5 with no increased pain   Sit to stand with use of hand and some pain along L HS   Palpation no pain along L spine, slight along post pelvis, OK with HS and ITB    Outcome Measures:       Treatments:     PT Therapeutic Procedures Time Entry  Manual Therapy Time Entry: 10  Therapeutic Exercise Time Entry: 30 minutes  Time Calculation  Start Time: 1000  Stop Time: 1040  Time Calculation (min): 40 min       PT Therapeutic Procedures Time Entry  Manual Therapy Time Entry: 10  Therapeutic Exercise Time Entry: 30     Therapeutic exercises:   Recheck  Forward T with HHA x5 R/L, L WB with pain along L HS  Double leg chair squat to 22in no HHA x5 with no increased pain   LTR x5 each R/L  Supine bridging x10   Sidelying hip abd x10 L  Sidelying clamshell x10 L   Supine and seated figure 4 stretch x5  Sit to stand no HHA to 22in table x10   Issued handout to work on gentle stretching, hip strengthening, avoiding closing airway with straining/coughing motions to avoid increased intraabdominal pressure       Manual Therapy 10 minutes  STM with massage gun to L post pelvis, HS, ITB area        Education/Resources provided today: Home Program   [employer]:  Access Code: [email or web address]  URL:  [email or web address]  Date: 05/29/2025  Prepared by: Radha Wong    Exercises  - Supine Lower Trunk Rotation  - 1 x daily - 4 x weekly - 1 sets - 10 reps  - Supine Bridge  - 1 x daily - 4 x weekly - 1 sets - 10 reps  - Supine Figure 4 Piriformis Stretch  - 1 x daily - 4 x weekly - 1 sets - 5 reps  - Clamshell  - 1 x daily - 4 x weekly - 1 sets - 10 reps  - Sidelying Hip Abduction  - 1 x daily - 4 x weekly - 1 sets - 10 reps  - Seated Figure 4 Piriformis Stretch  - 1 x daily - 4 x weekly - 1 sets - 5 reps  - Sit to Stand Without Arm Support  - 1 x daily - 4 x weekly - 1 sets - 10 reps    OP EDUCATION:  Outpatient Education  Individual(s) Educated: Patient  Education Provided: POC  Patient/Caregiver Demonstrated Understanding: yes  Plan of Care Discussed and Agreed Upon: yes  Patient Response to Education: Patient/Caregiver Verbalized Understanding of Information    Goals:  To be met at time of discharge:  -- Decrease pain to 1/10_.  -- Independent with HEP.  -- Functional outcome:   Able to continue with daily activities involving sit to stand, getting in and out of car or bed and other transitions with pain <1/10.   Able to continue with his exercises routine with  with no increased pain.

## 2025-06-09 ENCOUNTER — TREATMENT (OUTPATIENT)
Dept: PHYSICAL THERAPY | Facility: HOSPITAL | Age: 80
End: 2025-06-09
Payer: COMMERCIAL

## 2025-06-09 DIAGNOSIS — M79.605 LEFT LEG PAIN: Primary | ICD-10-CM

## 2025-06-09 PROCEDURE — 97140 MANUAL THERAPY 1/> REGIONS: CPT | Mod: GP | Performed by: PHYSICAL THERAPIST

## 2025-06-09 PROCEDURE — 97110 THERAPEUTIC EXERCISES: CPT | Mod: GP | Performed by: PHYSICAL THERAPIST

## 2025-06-09 NOTE — PROGRESS NOTES
Physical Therapy Treatment    Patient Name:Farooq Hendrix   MRN:[id number]   Today's Date:6/9/2025   Referred by: Bran Shin   Time Calculation  Start Time: 0438  Stop Time: 0526  Time Calculation (min): 48 min       PT Therapeutic Procedures Time Entry  Manual Therapy Time Entry: 15  Therapeutic Exercise Time Entry: 33       Insurance  Visit number: 3   Approved number of visits: 30  Auth required: No  Authorization date range:   Onset Date: 4/7/2025    Payor: [employer] [facility] / Plan: [employer] / Product Type: *No Product type* /     Assessment:   81 yo male seen in the past for LBP, sx mostly resolved, pt returns to PT with new c/o intermittent pain L post thigh with transitioning movement including sit to stand, getting in and out of car/bed, at time of evaluation pt displays functional ROM, WNL palpation, but with pain with L SLR end range testing and with L HS testing, worked on STM and pt was instructed to avoid putting phone in backpocket.  Upon last follow up pt had more frequent pain L lateral and post thigh with sitting, standing, transition and with straining activities, suspect possibly due to degenerative changes L spine, was working on STM and gentle stretching and hip strengthening.    At today's follow up 6/9/25 sx has improved, still has pain with turning in bed, straining, end range LTR, otherwise no significant findings, added gentle flex stretching, repeated STM, added light core strengthening with PPT  in addition to previous hip strengthening, handout issued, recheck in 2 weeks,  pt is to call if any questions.      Plan:  Recheck in 2 weeks, progress on flex  based core strengthening as able.      Precautions/Fall Risk:  Fall Risk: Low based on professional judgment  Pacemaker: no    Seizures: No    Post Op Movement/Restrictions: No:  Weight Bearing Status: As tolerated  Other Medical precautions:      OP PT Plan  PT Plan: Skilled PT  PT Frequency:  (recheck in 3  weeks, sooner if needed)  Onset Date: 04/07/25  Number of Treatments Authorized: MMO 30V  Rehab Potential: Good  Plan of Care Agreement: Patient    Therapy Diagnosis   Problem List Items Addressed This Visit           ICD-10-CM       Musculoskeletal and Injuries    Left leg pain - Primary M79.605       Current Problem  1. Left leg pain  Follow Up In Physical Therapy              Subjective/Pain:  Current Pain Level (0-10): 5  Pt states he feels a little better, less frequent and less intense, OK with sit to stand, still feels some pain with turning in bed and valsava (coughing, sneezing, straining), no pain today, doing his HEP, when painful, located on L lateral and post thigh.         HEP Compliance: Fair    General:  General  Reason for Referral: chronic pain R LE  Referred By: Bran crandall MD  Past Medical History Relevant to Rehab: HTN, DM  Preferred Learning Style: verbal, visual, written  Precautions:       Objective/Outcome Measures:  Palpation no pain along L spine, slight along post pelvis, OK with HS and ITB  Ok with sit to stand, continues with some pain with LTR to R side at end range    Outcome Measures:       Treatments:     PT Therapeutic Procedures Time Entry  Manual Therapy Time Entry: 15  Therapeutic Exercise Time Entry: 33 minutes  Time Calculation  Start Time: 0438  Stop Time: 0526  Time Calculation (min): 48 min       PT Therapeutic Procedures Time Entry  Manual Therapy Time Entry: 15  Therapeutic Exercise Time Entry: 33     Therapeutic exercises:   Recumbent stepper L 3 x9min  Double leg sit to stand to 18in no HHA x10     LTR x5 each R/L  Supine bridging x15   Sidelying hip abd x15 L  Sidelying clamshell x15 L   PPT with alt LE to supine 90-90 x10 with verbal and tactile cues      Issued handout to work on gentle core and hip strengthening, avoiding closing airway with straining/coughing motions to avoid increased intraabdominal pressure       Manual Therapy 15 minutes  STM with massage gun  to L post pelvis, HS, ITB area  Manual L LE HS gentle stretching and DKTC with slight overpressure for flex stretching in supine       Education/Resources provided today: Home Program   [employer]:  Access Code: [email or web address]  URL: [email or web address]  Date: 06/09/2025  Prepared by: Radha Wong    Exercises  - Supine Lower Trunk Rotation  - 1 x daily - 4 x weekly - 1 sets - 10 reps  - Supine Bridge  - 1 x daily - 4 x weekly - 1 sets - 10 reps  - supine 90/90 march with pelvic tilt  - 1 x daily - 4 x weekly - 1 sets - 10 reps  - Clamshell  - 1 x daily - 4 x weekly - 1 sets - 10 reps  - Sidelying Hip Abduction  - 1 x daily - 4 x weekly - 1 sets - 10 reps  - Sit to Stand Without Arm Support  - 1 x daily - 4 x weekly - 1 sets - 10 reps    OP EDUCATION:  Outpatient Education  Individual(s) Educated: Patient  Education Provided: POC  Patient/Caregiver Demonstrated Understanding: yes  Plan of Care Discussed and Agreed Upon: yes  Patient Response to Education: Patient/Caregiver Verbalized Understanding of Information    Goals:  To be met at time of discharge:  -- Decrease pain to 1/10_.  -- Independent with HEP.  -- Functional outcome:   Able to continue with daily activities involving sit to stand, getting in and out of car or bed and other transitions with pain <1/10.   Able to continue with his exercises routine with  with no increased pain.

## 2025-06-24 ENCOUNTER — TREATMENT (OUTPATIENT)
Dept: PHYSICAL THERAPY | Facility: HOSPITAL | Age: 80
End: 2025-06-24
Payer: COMMERCIAL

## 2025-06-24 DIAGNOSIS — M79.605 LEFT LEG PAIN: Primary | ICD-10-CM

## 2025-06-24 PROCEDURE — 97140 MANUAL THERAPY 1/> REGIONS: CPT | Mod: GP | Performed by: PHYSICAL THERAPIST

## 2025-06-24 PROCEDURE — 97110 THERAPEUTIC EXERCISES: CPT | Mod: GP | Performed by: PHYSICAL THERAPIST

## 2025-06-24 NOTE — PROGRESS NOTES
Physical Therapy Treatment    Patient Name:Farooq Hendrix   MRN:[id number]   Today's Date:6/24/2025   Referred by: Bran Shin   Time Calculation  Start Time: 0315  Stop Time: 0355  Time Calculation (min): 40 min       PT Therapeutic Procedures Time Entry  Therapeutic Exercise Time Entry: 30  Manual Therapy Time Entry: 10       Insurance  Visit number: 4   Approved number of visits: 30  Auth required: No  Authorization date range:   Onset Date: 4/7/2025    Payor: [employer] [facility] / Plan: [employer] / Product Type: *No Product type* /     Assessment:   79 yo male seen in the past for LBP, sx mostly resolved, pt returns to PT with new c/o intermittent pain L post thigh with transitioning movement including sit to stand, getting in and out of car/bed, at time of evaluation pt displays functional ROM, WNL palpation, but with pain with L SLR end range testing and with L HS testing, worked on STM and pt was instructed to avoid putting phone in backpocket.  6/24/25 slowly improving, still has pain with turning in bed, straining, end range LTR, otherwise no significant findings, pain eases with flex based core stab with PPT, HEP updated to focus on PPT and progression of core stab, handout issued,   pt is to call if any questions.      Plan:  Continue with flex based core stab, if doing well, can work on discharge to self management soon.     Precautions/Fall Risk:  Fall Risk: Low based on professional judgment  Pacemaker: no    Seizures: No    Post Op Movement/Restrictions: No:  Weight Bearing Status: As tolerated  Other Medical precautions:      OP PT Plan  PT Plan: Skilled PT  PT Frequency:  (recheck in 3 weeks, sooner if needed)  Onset Date: 04/07/25  Number of Treatments Authorized: MMO [id number]  Rehab Potential: Good  Plan of Care Agreement: Patient    Therapy Diagnosis   Problem List Items Addressed This Visit           ICD-10-CM       Musculoskeletal and Injuries    Left leg pain - Primary  M79.605       Current Problem  1. Left leg pain  Follow Up In Physical Therapy              Subjective/Pain:  Current Pain Level (0-10): 0  Pt states continues to feel a little better, less pain with sit to stand and with straining, no pain at present.  Does not wake up from sleeping.        HEP Compliance: Fair    General:  General  Reason for Referral: chronic pain R LE  Referred By: Bran crandall MD  Past Medical History Relevant to Rehab: HTN, DM  Preferred Learning Style: verbal, visual, written  Precautions:       Objective/Outcome Measures:  Palpation no pain along L spine, slight along post pelvis, OK with HS and ITB  Ok with sit to stand, continues with some pain with LTR to R side at end range    Outcome Measures:       Treatments:     PT Therapeutic Procedures Time Entry  Therapeutic Exercise Time Entry: 30  Manual Therapy Time Entry: 10 minutes  Time Calculation  Start Time: 0315  Stop Time: 0355  Time Calculation (min): 40 min       PT Therapeutic Procedures Time Entry  Therapeutic Exercise Time Entry: 30  Manual Therapy Time Entry: 10     Therapeutic exercises:   Recumbent stepper L 3 x8min  Supine sciatic nerve gliding with PPT 2x5 R/L  SLR L side with pain, with PPT with ease of pain  PPT 5sec x5  PPT with bridging 2x10  PPT with SLR 2xc5 R/L  PPT with alt LE to 90-90 then lowering x5 R/L with cues for technique   New HEP handout to focus on PPT and flex based core stab as above, handout issued          Manual Therapy 10 minutes  STM with massage gun to L post pelvis, HS, ITB area  Manual L LE HS gentle stretching and DKTC with slight overpressure for flex stretching in supine       Education/Resources provided today: Home Program   [place]:  Access Code: [email or web address]  URL: [email or web address]  Date: 06/24/2025  Prepared by: Radha Wong    Exercises  - Supine Lower Trunk Rotation  - 1 x daily - 4 x weekly - 1 sets - 10 reps  - Supine Posterior Pelvic Tilt  - 1 x daily - 4 x  weekly - 1 sets - 10 reps - 5sec hold  - Supine Bridge  - 1 x daily - 4 x weekly - 1 sets - 10 reps  - supine 90/90 march with pelvic tilt  - 1 x daily - 4 x weekly - 1 sets - 10 reps  - Supine Transversus Abdominis Bracing with Leg Extension  - 1 x daily - 4 x weekly - 1 sets - 10 reps  - Sit to Stand Without Arm Support  - 1 x daily - 4 x weekly - 1 sets - 10 reps    OP EDUCATION:  Outpatient Education  Individual(s) Educated: Patient  Education Provided: POC  Patient/Caregiver Demonstrated Understanding: yes  Plan of Care Discussed and Agreed Upon: yes  Patient Response to Education: Patient/Caregiver Verbalized Understanding of Information    Goals:  To be met at time of discharge:  -- Decrease pain to 1/10_.  -- Independent with HEP.  -- Functional outcome:   Able to continue with daily activities involving sit to stand, getting in and out of car or bed and other transitions with pain <1/10.   Able to continue with his exercises routine with  with no increased pain.

## 2025-07-11 ENCOUNTER — TREATMENT (OUTPATIENT)
Dept: PHYSICAL THERAPY | Facility: HOSPITAL | Age: 80
End: 2025-07-11
Payer: COMMERCIAL

## 2025-07-11 DIAGNOSIS — M79.605 LEFT LEG PAIN: Primary | ICD-10-CM

## 2025-07-11 PROCEDURE — 97110 THERAPEUTIC EXERCISES: CPT | Mod: GP | Performed by: PHYSICAL THERAPIST

## 2025-07-11 PROCEDURE — 97014 ELECTRIC STIMULATION THERAPY: CPT | Mod: GP | Performed by: PHYSICAL THERAPIST

## 2025-07-11 NOTE — PROGRESS NOTES
Physical Therapy Treatment    Patient Name:Farooq Hendrix   MRN:[id number]   Today's Date:7/11/2025   Referred by: Bran Shin   Time Calculation  Start Time: 0100  Stop Time: 0145  Time Calculation (min): 45 min       PT Therapeutic Procedures Time Entry  Therapeutic Exercise Time Entry: 35  PT Modalities Time Entry  E-Stim (Unattended) Time Entry: 10    Insurance  Visit number: 5   Approved number of visits: 30  Auth required: No  Authorization date range:   Onset Date: 4/7/2025    Payor: [employer] [facility] / Plan: [employer] / Product Type: *No Product type* /     Assessment:   81 yo male seen in the past for LBP, sx mostly resolved, pt returns to PT with new c/o intermittent pain L post thigh with transitioning movement including sit to stand, getting in and out of car/bed, at time of evaluation pt displays functional ROM, WNL palpation, but with pain with L SLR end range testing and with L HS testing, worked on STM and pt was instructed to avoid putting phone in backpocket.  7/11/25 slowly improving, still has pain with turning in bed,  end range LTR, in and out of car, otherwise no significant findings, pain eases with flex based core stab with PPT,  reviewed PPT technique and continued effort of HEP  focus on PPT and progression of core stab, trial of TENS and provided information for home TENS unit. Pt will continue to work on HEP and trial of TENS unit.     Plan:  Transition to self management with above, follow up prn.  Pt will call if any questions.      Precautions/Fall Risk:  Fall Risk: Low based on professional judgment  Pacemaker: no    Seizures: No    Post Op Movement/Restrictions: No:  Weight Bearing Status: As tolerated  Other Medical precautions:      OP PT Plan  PT Plan: Skilled PT  PT Frequency:  (recheck in 3 weeks, sooner if needed)  Onset Date: 04/07/25  Number of Treatments Authorized: MMO 30V  Rehab Potential: Good  Plan of Care Agreement: Patient    Therapy  Diagnosis   Problem List Items Addressed This Visit           ICD-10-CM       Musculoskeletal and Injuries    Left leg pain - Primary M79.605       Current Problem  1. Left leg pain  Follow Up In Physical Therapy              Subjective/Pain:  Current Pain Level (0-10): 6 during the transition, quickly subsides to 0  Pt states continues to feel a little better, still feels pain along L thigh with turning in bed, getting in and out of car and bending over to reach, better with straining and sitting, some pain with prolonged standing.   Doing some exercises daily including core exercises.        HEP Compliance: Fair    General:  General  Reason for Referral: chronic pain R LE  Referred By: Bran crandall MD  Past Medical History Relevant to Rehab: HTN, DM  Preferred Learning Style: verbal, visual, written  Precautions:       Objective/Outcome Measures:  Palpation no pain along L spine,  post pelvis, OK with HS and ITB  Ok with sit to stand, continues with some pain with LTR to R side at end range  Pain with transition sit to supine     Outcome Measures:       Treatments:     PT Therapeutic Procedures Time Entry  Therapeutic Exercise Time Entry: 35 minutes  Time Calculation  Start Time: 0100  Stop Time: 0145  Time Calculation (min): 45 min       PT Therapeutic Procedures Time Entry  Therapeutic Exercise Time Entry: 35  PT Modalities Time Entry  E-Stim (Unattended) Time Entry: 10    Therapeutic exercises:   Recumbent stepper L 3 x8min  PPT 5sec x5  PPT with SLR x10 R/L  PPT with alt LE to 90-90 then lowering x10 R/L with cues for technique   PPT with reverse DKTC x10   PPT with alt LE marching hooklying x10 R/L  Cues for PPT, breathing and core stab with above.    Discussed further POC, to continue with PPT with flex based core stab, information provided for TENS unit for home use, pt verbalized understanding.          Modalities  TENS with MHP to L/S junction x10min  Information provided to obtain a home unit at home.         Education/Resources provided today: Home Program   [place] Fixed:  Access Code: [email or web address]  URL: [email or web address]  Date: 06/24/2025  Prepared by: Radha Wong    Exercises  - Supine Lower Trunk Rotation  - 1 x daily - 4 x weekly - 1 sets - 10 reps  - Supine Posterior Pelvic Tilt  - 1 x daily - 4 x weekly - 1 sets - 10 reps - 5sec hold  - Supine Bridge  - 1 x daily - 4 x weekly - 1 sets - 10 reps  - supine 90/90 march with pelvic tilt  - 1 x daily - 4 x weekly - 1 sets - 10 reps  - Supine Transversus Abdominis Bracing with Leg Extension  - 1 x daily - 4 x weekly - 1 sets - 10 reps  - Sit to Stand Without Arm Support  - 1 x daily - 4 x weekly - 1 sets - 10 reps    OP EDUCATION:  Outpatient Education  Individual(s) Educated: Patient  Education Provided: POC  Patient/Caregiver Demonstrated Understanding: yes  Plan of Care Discussed and Agreed Upon: yes  Patient Response to Education: Patient/Caregiver Verbalized Understanding of Information    Goals:  To be met at time of discharge:  -- Decrease pain to 1/10_.  -- Independent with HEP.  -- Functional outcome:   Able to continue with daily activities involving sit to stand, getting in and out of car or bed and other transitions with pain <1/10.   Able to continue with his exercises routine with  with no increased pain.

## 2025-07-28 ENCOUNTER — PATIENT MESSAGE (OUTPATIENT)
Dept: BEHAVIORAL HEALTH | Facility: CLINIC | Age: 80
End: 2025-07-28
Payer: COMMERCIAL

## 2025-07-28 DIAGNOSIS — F41.8 DEPRESSION WITH ANXIETY: ICD-10-CM

## 2025-07-28 RX ORDER — VENLAFAXINE HYDROCHLORIDE 150 MG/1
150 TABLET, EXTENDED RELEASE ORAL DAILY
Qty: 90 TABLET | Refills: 1 | Status: SHIPPED | OUTPATIENT
Start: 2025-07-28

## 2025-07-30 DIAGNOSIS — E11.9 TYPE 2 DIABETES MELLITUS WITHOUT COMPLICATION, WITHOUT LONG-TERM CURRENT USE OF INSULIN: ICD-10-CM

## 2025-07-30 RX ORDER — SEMAGLUTIDE 0.68 MG/ML
0.5 INJECTION, SOLUTION SUBCUTANEOUS
Qty: 9 ML | Refills: 1 | Status: SHIPPED | OUTPATIENT
Start: 2025-07-30

## 2025-08-11 DIAGNOSIS — I10 HYPERTENSION, UNSPECIFIED TYPE: ICD-10-CM

## 2025-08-11 DIAGNOSIS — F41.8 DEPRESSION WITH ANXIETY: ICD-10-CM

## 2025-08-11 DIAGNOSIS — E11.42 TYPE 2 DIABETES MELLITUS WITH DIABETIC POLYNEUROPATHY, WITHOUT LONG-TERM CURRENT USE OF INSULIN: ICD-10-CM

## 2025-08-11 RX ORDER — TRAZODONE HYDROCHLORIDE 50 MG/1
50 TABLET ORAL NIGHTLY
Qty: 90 TABLET | Refills: 1 | Status: SHIPPED | OUTPATIENT
Start: 2025-08-11

## 2025-08-12 RX ORDER — LISINOPRIL 10 MG/1
10 TABLET ORAL
Qty: 180 TABLET | Refills: 0 | Status: SHIPPED | OUTPATIENT
Start: 2025-08-12

## 2025-09-02 ENCOUNTER — APPOINTMENT (OUTPATIENT)
Dept: BEHAVIORAL HEALTH | Facility: CLINIC | Age: 80
End: 2025-09-02
Payer: COMMERCIAL

## 2025-09-02 ASSESSMENT — PAIN SCALES - GENERAL: PAINLEVEL_OUTOF10: 0-NO PAIN

## 2025-09-29 ENCOUNTER — APPOINTMENT (OUTPATIENT)
Dept: ENDOCRINOLOGY | Facility: CLINIC | Age: 80
End: 2025-09-29
Payer: COMMERCIAL

## 2025-10-02 ENCOUNTER — APPOINTMENT (OUTPATIENT)
Dept: ENDOCRINOLOGY | Facility: CLINIC | Age: 80
End: 2025-10-02
Payer: COMMERCIAL

## 2025-12-02 ENCOUNTER — APPOINTMENT (OUTPATIENT)
Dept: BEHAVIORAL HEALTH | Facility: CLINIC | Age: 80
End: 2025-12-02
Payer: COMMERCIAL

## 2026-03-26 ENCOUNTER — APPOINTMENT (OUTPATIENT)
Dept: ENDOCRINOLOGY | Facility: CLINIC | Age: 81
End: 2026-03-26
Payer: COMMERCIAL